# Patient Record
Sex: MALE | Race: WHITE | NOT HISPANIC OR LATINO | Employment: FULL TIME | URBAN - NONMETROPOLITAN AREA
[De-identification: names, ages, dates, MRNs, and addresses within clinical notes are randomized per-mention and may not be internally consistent; named-entity substitution may affect disease eponyms.]

---

## 2023-04-03 DIAGNOSIS — I10 PRIMARY HYPERTENSION: Primary | ICD-10-CM

## 2023-04-03 RX ORDER — METOPROLOL SUCCINATE 25 MG/1
25 TABLET, EXTENDED RELEASE ORAL DAILY
COMMUNITY
Start: 2023-03-05 | End: 2023-04-03 | Stop reason: SDUPTHER

## 2023-04-04 RX ORDER — METOPROLOL SUCCINATE 25 MG/1
25 TABLET, EXTENDED RELEASE ORAL DAILY
Qty: 30 TABLET | Refills: 0 | Status: SHIPPED | OUTPATIENT
Start: 2023-04-04 | End: 2023-05-01

## 2023-05-15 NOTE — PROGRESS NOTES
Subjective     Hugo Serrano is a 57 y.o. male who presents for Follow-up (Follow up meds/check up).      HPI  The patient is a 57 year-old male presenting to the clinic for follow up  . Patient      Had been to the ophthalmologist.  Complaining both eyes are red and crusting.  Has appointment coming up with ophthalmologist.    Bacitracin ophthalmic ointment was prescribed.  Use ointment as directed. He complains of dry mouth.  Referral placed with ENT 5/16/2023.  Educated the patient on obesity, low-fat diet and exercise. Encouraged the patient to lose weight. I have educated the patient on fatigue. The patient denies restless leg syndrome and denies obstructive sleep apnea. Follow up in office. Educated on Dyslipidemia.  Educated on hypertension, low-salt diet and exercise. Will continue to monitor.    Educated on dyslipidemia and diet and exercise.  Complaining feeling tired.  Advised on diet and exercise.  Educated on hypertension low-salt and exercise but educated on obesity and diet and exercise and advised to lose weight.  Complaining dry mouth.      Review of Systems  Review of systems:    General:  Denies fever.  Denies chills.    HEENT:Dictated as above.    Respiratory:  Denies cough.  Denies shortness of breath.    Cardiovascular:  Dictated as above.    Gastrointestinal:  Denies nausea vomiting diarrhea.  Denies abdominal pain.    Genitourinary: Denies burning urination.  Denies frequent urination.  Denies flank pain.  Denies blood in the urine.      Neurology:  Denies tingling numbness but denies weakness.  Denies headache.  Denies blurred vision.    Musculoskeletal:  Denies body aches.  Denies joint pains.  Denies muscle aches.  Denies muscle weakness    Endocrinology:  Dictated as above.    Psychiatric:  Denies depression.  Denies anxiety.  Denies suicidal.  Denies homicidal.      Objective   /77 (BP Location: Left arm, Patient Position: Sitting, BP Cuff Size: Large adult long)   Pulse 76   Ht  1.829 m (6')   Wt (!) 155 kg (342 lb)   SpO2 98%   BMI 46.38 kg/m²        Physical Exam  General.  Not in distress.  HEENT normocephalic anicteric sclerae.  Both palpebral and bulbar conjunctival redness noted neck soft supple no thyromegaly.  No carotid bruit.  Lungs are clear.  Heart regular.  Abdomen soft nontender nondistended bowel sounds are positive.  Extremities no clubbing cyanosis or edema.  Psychiatric.  Has good eye contact.  No crying spells noted.  Speech was normal.  Denies depression.  Denies suicidal.  Denies homicidal.      Assessment/Plan   1.  Bilateral conjunctivitis.  Started on medication.  Keep appointment with ophthalmologist.    2.  Dyslipidemia.  Educated on diet exercise.  We will continue monitor.    3.  Fatigue.  Dictated as above.    4.  Hypertension.  Educated on low-salt diet exercise.  Continue current management    5.  Obesity.  Dictated as above    6.  Dry mouth.  Dictated as above                                  Problem List Items Addressed This Visit          Circulatory    Benign essential HTN       Other    Chronic fatigue    Dyslipidemia     Other Visit Diagnoses       Acute bacterial conjunctivitis of both eyes    -  Primary    Relevant Medications    bacitracin-polymyxin B (Polysporin) ophthalmic ointment    Class 3 severe obesity due to excess calories with serious comorbidity and body mass index (BMI) of 45.0 to 49.9 in adult (CMS/[facility], located within [facility])        Mouth dryness        Relevant Orders    Referral to ENT            Scribe Attestation  By signing my name below, Apurva SHUKLA Scribe   attest that this documentation has been prepared under the direction and in the presence of Patel Fuentes MD.

## 2023-05-16 ENCOUNTER — OFFICE VISIT (OUTPATIENT)
Dept: PRIMARY CARE | Facility: CLINIC | Age: 58
End: 2023-05-16
Payer: COMMERCIAL

## 2023-05-16 VITALS
SYSTOLIC BLOOD PRESSURE: 131 MMHG | BODY MASS INDEX: 42.66 KG/M2 | OXYGEN SATURATION: 98 % | WEIGHT: 315 LBS | DIASTOLIC BLOOD PRESSURE: 77 MMHG | HEIGHT: 72 IN | HEART RATE: 76 BPM

## 2023-05-16 DIAGNOSIS — R53.82 CHRONIC FATIGUE: ICD-10-CM

## 2023-05-16 DIAGNOSIS — E78.5 DYSLIPIDEMIA: ICD-10-CM

## 2023-05-16 DIAGNOSIS — H10.33 ACUTE BACTERIAL CONJUNCTIVITIS OF BOTH EYES: Primary | ICD-10-CM

## 2023-05-16 DIAGNOSIS — I10 BENIGN ESSENTIAL HTN: ICD-10-CM

## 2023-05-16 DIAGNOSIS — R68.2 MOUTH DRYNESS: ICD-10-CM

## 2023-05-16 DIAGNOSIS — E66.01 CLASS 3 SEVERE OBESITY DUE TO EXCESS CALORIES WITH SERIOUS COMORBIDITY AND BODY MASS INDEX (BMI) OF 45.0 TO 49.9 IN ADULT (MULTI): ICD-10-CM

## 2023-05-16 PROBLEM — M79.10 MYALGIA: Status: ACTIVE | Noted: 2023-05-16

## 2023-05-16 PROBLEM — B35.1 ONYCHOMYCOSIS OF TOENAIL: Status: ACTIVE | Noted: 2023-05-16

## 2023-05-16 PROBLEM — M75.101 ROTATOR CUFF SYNDROME OF RIGHT SHOULDER: Status: ACTIVE | Noted: 2023-05-16

## 2023-05-16 PROBLEM — M54.12 ACUTE CERVICAL RADICULOPATHY: Status: ACTIVE | Noted: 2023-05-16

## 2023-05-16 PROBLEM — I83.893 VARICOSE VEINS OF BOTH LEGS WITH EDEMA: Status: ACTIVE | Noted: 2023-05-16

## 2023-05-16 PROBLEM — M25.519 SHOULDER PAIN: Status: ACTIVE | Noted: 2023-05-16

## 2023-05-16 PROBLEM — E66.3 OVERWEIGHT: Status: ACTIVE | Noted: 2023-05-16

## 2023-05-16 PROBLEM — M79.18 MYOFASCIAL PAIN: Status: ACTIVE | Noted: 2023-05-16

## 2023-05-16 PROBLEM — J34.89 NASAL CRUSTING: Status: ACTIVE | Noted: 2023-05-16

## 2023-05-16 PROBLEM — M75.102 ROTATOR CUFF SYNDROME OF LEFT SHOULDER: Status: ACTIVE | Noted: 2023-05-16

## 2023-05-16 PROBLEM — N32.81 OAB (OVERACTIVE BLADDER): Status: ACTIVE | Noted: 2023-05-16

## 2023-05-16 PROBLEM — R60.0 BILATERAL LEG EDEMA: Status: ACTIVE | Noted: 2023-05-16

## 2023-05-16 PROBLEM — M25.50 POLYARTHRALGIA: Status: ACTIVE | Noted: 2023-05-16

## 2023-05-16 PROBLEM — R53.83 OTHER FATIGUE: Status: ACTIVE | Noted: 2018-12-13

## 2023-05-16 PROBLEM — M54.2 CERVICALGIA: Status: ACTIVE | Noted: 2023-05-16

## 2023-05-16 PROBLEM — K21.9 GASTROESOPHAGEAL REFLUX DISEASE: Status: ACTIVE | Noted: 2023-05-16

## 2023-05-16 PROBLEM — M79.89 LEG SWELLING: Status: ACTIVE | Noted: 2023-05-16

## 2023-05-16 PROBLEM — E55.9 VITAMIN D DEFICIENCY: Status: ACTIVE | Noted: 2023-05-16

## 2023-05-16 PROBLEM — R21 SKIN RASH: Status: ACTIVE | Noted: 2023-05-16

## 2023-05-16 PROBLEM — M25.522 LEFT ELBOW PAIN: Status: ACTIVE | Noted: 2023-05-16

## 2023-05-16 PROBLEM — M47.812 CERVICAL SPONDYLOSIS WITHOUT MYELOPATHY: Status: ACTIVE | Noted: 2023-05-16

## 2023-05-16 PROBLEM — F90.9 ADULT ADHD: Status: ACTIVE | Noted: 2023-05-16

## 2023-05-16 PROBLEM — M75.22 BICEPS TENDINITIS OF LEFT UPPER EXTREMITY: Status: ACTIVE | Noted: 2023-05-16

## 2023-05-16 PROBLEM — R20.0 NUMBNESS: Status: ACTIVE | Noted: 2023-05-16

## 2023-05-16 PROBLEM — G47.33 OSA (OBSTRUCTIVE SLEEP APNEA): Status: ACTIVE | Noted: 2023-05-16

## 2023-05-16 PROBLEM — J31.0 RHINITIS, CHRONIC: Status: ACTIVE | Noted: 2023-05-16

## 2023-05-16 PROBLEM — B35.6 JOCK ITCH: Status: ACTIVE | Noted: 2023-05-16

## 2023-05-16 PROBLEM — J30.9 ALLERGIC RHINITIS: Status: ACTIVE | Noted: 2023-05-16

## 2023-05-16 PROBLEM — R63.5 WEIGHT GAIN: Status: ACTIVE | Noted: 2023-05-16

## 2023-05-16 PROBLEM — M79.642 PAIN OF LEFT HAND: Status: ACTIVE | Noted: 2023-05-16

## 2023-05-16 PROBLEM — H44.002 INFECTION OF LEFT EYE: Status: ACTIVE | Noted: 2023-05-16

## 2023-05-16 PROBLEM — L08.9 SKIN INFECTION: Status: ACTIVE | Noted: 2023-05-16

## 2023-05-16 PROBLEM — J01.00 ACUTE MAXILLARY SINUSITIS: Status: ACTIVE | Noted: 2023-05-16

## 2023-05-16 PROBLEM — H33.22 LEFT RETINAL DETACHMENT: Status: ACTIVE | Noted: 2023-05-16

## 2023-05-16 PROBLEM — R07.89 ATYPICAL CHEST PAIN: Status: ACTIVE | Noted: 2023-05-16

## 2023-05-16 PROBLEM — M26.629 TEMPOROMANDIBULAR JOINT PAIN DYSFUNCTION SYNDROME: Status: ACTIVE | Noted: 2023-05-16

## 2023-05-16 PROBLEM — R20.0 NUMBNESS OF LEFT HAND: Status: ACTIVE | Noted: 2023-05-16

## 2023-05-16 PROBLEM — M79.602 PAIN OF LEFT UPPER EXTREMITY: Status: ACTIVE | Noted: 2023-05-16

## 2023-05-16 PROCEDURE — 99214 OFFICE O/P EST MOD 30 MIN: CPT | Performed by: FAMILY MEDICINE

## 2023-05-16 PROCEDURE — 3078F DIAST BP <80 MM HG: CPT | Performed by: FAMILY MEDICINE

## 2023-05-16 PROCEDURE — 3075F SYST BP GE 130 - 139MM HG: CPT | Performed by: FAMILY MEDICINE

## 2023-05-16 PROCEDURE — 3008F BODY MASS INDEX DOCD: CPT | Performed by: FAMILY MEDICINE

## 2023-05-16 PROCEDURE — 1036F TOBACCO NON-USER: CPT | Performed by: FAMILY MEDICINE

## 2023-05-16 RX ORDER — LORATADINE 10 MG/1
10 TABLET ORAL DAILY
COMMUNITY
Start: 2022-06-06

## 2023-05-16 RX ORDER — MELOXICAM 15 MG/1
1 TABLET ORAL DAILY
COMMUNITY
Start: 2023-02-08

## 2023-05-16 RX ORDER — ERGOCALCIFEROL 1.25 MG/1
1 CAPSULE ORAL
COMMUNITY

## 2023-05-16 RX ORDER — LOSARTAN POTASSIUM 100 MG/1
100 TABLET ORAL DAILY
COMMUNITY

## 2023-05-16 RX ORDER — FAMOTIDINE 20 MG/1
1 TABLET, FILM COATED ORAL 2 TIMES DAILY
COMMUNITY
Start: 2023-02-04

## 2023-05-16 RX ORDER — BACITRACIN ZINC AND POLYMYXIN B SULFATE 500; 10000 [USP'U]/G; [USP'U]/G
OINTMENT OPHTHALMIC EVERY 12 HOURS
Qty: 3.5 G | Refills: 0 | Status: SHIPPED | OUTPATIENT
Start: 2023-05-16 | End: 2023-05-26

## 2023-05-16 ASSESSMENT — PAIN SCALES - GENERAL: PAINLEVEL: 0-NO PAIN

## 2023-07-30 DIAGNOSIS — I10 PRIMARY HYPERTENSION: ICD-10-CM

## 2023-08-01 RX ORDER — METOPROLOL SUCCINATE 25 MG/1
TABLET, EXTENDED RELEASE ORAL
Qty: 30 TABLET | Refills: 0 | Status: SHIPPED | OUTPATIENT
Start: 2023-08-01

## 2023-08-26 DIAGNOSIS — I10 PRIMARY HYPERTENSION: ICD-10-CM

## 2023-08-28 RX ORDER — METOPROLOL SUCCINATE 25 MG/1
TABLET, EXTENDED RELEASE ORAL
Qty: 30 TABLET | Refills: 0 | OUTPATIENT
Start: 2023-08-28

## 2024-04-18 ENCOUNTER — LAB (OUTPATIENT)
Dept: LAB | Facility: LAB | Age: 59
End: 2024-04-18
Payer: COMMERCIAL

## 2024-04-18 DIAGNOSIS — I10 ESSENTIAL (PRIMARY) HYPERTENSION: Primary | ICD-10-CM

## 2024-04-18 DIAGNOSIS — Z13.220 ENCOUNTER FOR SCREENING FOR LIPOID DISORDERS: ICD-10-CM

## 2024-04-18 DIAGNOSIS — Z13.1 ENCOUNTER FOR SCREENING FOR DIABETES MELLITUS: ICD-10-CM

## 2024-04-18 LAB
ALBUMIN SERPL BCP-MCNC: 4 G/DL (ref 3.4–5)
ALP SERPL-CCNC: 52 U/L (ref 33–120)
ALT SERPL W P-5'-P-CCNC: 31 U/L (ref 10–52)
ANION GAP SERPL CALC-SCNC: 11 MMOL/L (ref 10–20)
AST SERPL W P-5'-P-CCNC: 25 U/L (ref 9–39)
BILIRUB SERPL-MCNC: 0.7 MG/DL (ref 0–1.2)
BUN SERPL-MCNC: 19 MG/DL (ref 6–23)
CALCIUM SERPL-MCNC: 8.8 MG/DL (ref 8.6–10.3)
CHLORIDE SERPL-SCNC: 106 MMOL/L (ref 98–107)
CHOLEST SERPL-MCNC: 172 MG/DL (ref 0–199)
CHOLESTEROL/HDL RATIO: 4
CO2 SERPL-SCNC: 25 MMOL/L (ref 21–32)
CREAT SERPL-MCNC: 0.86 MG/DL (ref 0.5–1.3)
EGFRCR SERPLBLD CKD-EPI 2021: >90 ML/MIN/1.73M*2
GLUCOSE SERPL-MCNC: 86 MG/DL (ref 74–99)
HDLC SERPL-MCNC: 43.5 MG/DL
LDLC SERPL CALC-MCNC: 118 MG/DL
NON HDL CHOLESTEROL: 129 MG/DL (ref 0–149)
POTASSIUM SERPL-SCNC: 4.1 MMOL/L (ref 3.5–5.3)
PROT SERPL-MCNC: 6.9 G/DL (ref 6.4–8.2)
SODIUM SERPL-SCNC: 138 MMOL/L (ref 136–145)
TRIGL SERPL-MCNC: 54 MG/DL (ref 0–149)
VLDL: 11 MG/DL (ref 0–40)

## 2024-04-18 PROCEDURE — 36415 COLL VENOUS BLD VENIPUNCTURE: CPT

## 2024-04-18 PROCEDURE — 80061 LIPID PANEL: CPT

## 2024-04-18 PROCEDURE — 83036 HEMOGLOBIN GLYCOSYLATED A1C: CPT

## 2024-04-18 PROCEDURE — 80053 COMPREHEN METABOLIC PANEL: CPT

## 2024-04-19 LAB
EST. AVERAGE GLUCOSE BLD GHB EST-MCNC: 117 MG/DL
HBA1C MFR BLD: 5.7 %

## 2024-08-09 PROBLEM — M54.59 MECHANICAL LOW BACK PAIN: Status: ACTIVE | Noted: 2024-08-09

## 2024-08-09 PROBLEM — M67.919 DISORDER OF ROTATOR CUFF: Status: ACTIVE | Noted: 2024-08-09

## 2024-08-09 PROBLEM — M53.3 TAIL BONE PAIN: Status: ACTIVE | Noted: 2024-08-09

## 2024-08-09 PROBLEM — R09.81 NASAL CONGESTION: Status: ACTIVE | Noted: 2024-08-09

## 2024-08-09 PROBLEM — I83.93 VARICOSE VEINS OF LEGS: Status: ACTIVE | Noted: 2024-08-09

## 2024-08-09 PROBLEM — R42 DIZZINESS: Status: ACTIVE | Noted: 2024-08-09

## 2024-08-09 PROBLEM — H04.309 DACRYOCYSTITIS: Status: ACTIVE | Noted: 2024-08-09

## 2024-08-09 PROBLEM — M25.551 PAIN IN RIGHT HIP: Status: ACTIVE | Noted: 2024-08-09

## 2024-08-09 PROBLEM — J20.9 ACUTE BRONCHITIS: Status: RESOLVED | Noted: 2024-08-09 | Resolved: 2024-08-09

## 2024-08-09 PROBLEM — Z91.018 FOOD ALLERGY: Status: ACTIVE | Noted: 2024-08-09

## 2024-08-19 ENCOUNTER — APPOINTMENT (OUTPATIENT)
Dept: PRIMARY CARE | Facility: CLINIC | Age: 59
End: 2024-08-19
Payer: COMMERCIAL

## 2024-08-19 VITALS
DIASTOLIC BLOOD PRESSURE: 85 MMHG | BODY MASS INDEX: 42.66 KG/M2 | OXYGEN SATURATION: 96 % | WEIGHT: 315 LBS | HEIGHT: 72 IN | HEART RATE: 69 BPM | SYSTOLIC BLOOD PRESSURE: 137 MMHG | RESPIRATION RATE: 16 BRPM

## 2024-08-19 DIAGNOSIS — L60.0 INGROWN TOENAIL: ICD-10-CM

## 2024-08-19 DIAGNOSIS — M25.551 PAIN IN RIGHT HIP: ICD-10-CM

## 2024-08-19 DIAGNOSIS — M79.674 PAIN OF TOE OF RIGHT FOOT: ICD-10-CM

## 2024-08-19 DIAGNOSIS — E66.01 MORBID OBESITY (MULTI): ICD-10-CM

## 2024-08-19 DIAGNOSIS — R06.09 DYSPNEA ON EXERTION: ICD-10-CM

## 2024-08-19 DIAGNOSIS — M75.101 ROTATOR CUFF SYNDROME OF RIGHT SHOULDER: ICD-10-CM

## 2024-08-19 DIAGNOSIS — M75.102 ROTATOR CUFF SYNDROME OF LEFT SHOULDER: ICD-10-CM

## 2024-08-19 DIAGNOSIS — R60.0 BILATERAL LEG EDEMA: ICD-10-CM

## 2024-08-19 DIAGNOSIS — E78.5 DYSLIPIDEMIA: ICD-10-CM

## 2024-08-19 DIAGNOSIS — K21.9 GASTROESOPHAGEAL REFLUX DISEASE WITHOUT ESOPHAGITIS: ICD-10-CM

## 2024-08-19 DIAGNOSIS — I10 BENIGN ESSENTIAL HTN: ICD-10-CM

## 2024-08-19 DIAGNOSIS — Z12.11 COLON CANCER SCREENING: Primary | ICD-10-CM

## 2024-08-19 DIAGNOSIS — J30.9 ALLERGIC RHINITIS, UNSPECIFIED SEASONALITY, UNSPECIFIED TRIGGER: ICD-10-CM

## 2024-08-19 PROCEDURE — 3008F BODY MASS INDEX DOCD: CPT | Performed by: INTERNAL MEDICINE

## 2024-08-19 PROCEDURE — 1036F TOBACCO NON-USER: CPT | Performed by: INTERNAL MEDICINE

## 2024-08-19 PROCEDURE — 3079F DIAST BP 80-89 MM HG: CPT | Performed by: INTERNAL MEDICINE

## 2024-08-19 PROCEDURE — 3075F SYST BP GE 130 - 139MM HG: CPT | Performed by: INTERNAL MEDICINE

## 2024-08-19 PROCEDURE — 99214 OFFICE O/P EST MOD 30 MIN: CPT | Performed by: INTERNAL MEDICINE

## 2024-08-19 RX ORDER — AMINOPHYLLINE 25 MG/ML
125 INJECTION, SOLUTION INTRAVENOUS ONCE AS NEEDED
OUTPATIENT
Start: 2024-08-19

## 2024-08-19 RX ORDER — REGADENOSON 0.08 MG/ML
0.4 INJECTION, SOLUTION INTRAVENOUS
OUTPATIENT
Start: 2024-08-19

## 2024-08-19 RX ORDER — MELOXICAM 15 MG/1
15 TABLET ORAL DAILY
Qty: 90 TABLET | Refills: 1 | Status: SHIPPED | OUTPATIENT
Start: 2024-08-19

## 2024-08-19 RX ORDER — FAMOTIDINE 20 MG/1
20 TABLET, FILM COATED ORAL 2 TIMES DAILY
Qty: 180 TABLET | Refills: 0 | Status: SHIPPED | OUTPATIENT
Start: 2024-08-19

## 2024-08-19 RX ORDER — DOXAZOSIN 4 MG/1
4 TABLET ORAL NIGHTLY
COMMUNITY
Start: 2024-07-23

## 2024-08-19 ASSESSMENT — ENCOUNTER SYMPTOMS
SHORTNESS OF BREATH: 1
CONSTITUTIONAL NEGATIVE: 1
NEUROLOGICAL NEGATIVE: 1
GASTROINTESTINAL NEGATIVE: 1
ARTHRALGIAS: 1
PSYCHIATRIC NEGATIVE: 1

## 2024-08-19 ASSESSMENT — PATIENT HEALTH QUESTIONNAIRE - PHQ9
SUM OF ALL RESPONSES TO PHQ9 QUESTIONS 1 AND 2: 0
2. FEELING DOWN, DEPRESSED OR HOPELESS: NOT AT ALL
1. LITTLE INTEREST OR PLEASURE IN DOING THINGS: NOT AT ALL

## 2024-08-19 ASSESSMENT — COLUMBIA-SUICIDE SEVERITY RATING SCALE - C-SSRS
6. HAVE YOU EVER DONE ANYTHING, STARTED TO DO ANYTHING, OR PREPARED TO DO ANYTHING TO END YOUR LIFE?: NO
2. HAVE YOU ACTUALLY HAD ANY THOUGHTS OF KILLING YOURSELF?: NO
1. IN THE PAST MONTH, HAVE YOU WISHED YOU WERE DEAD OR WISHED YOU COULD GO TO SLEEP AND NOT WAKE UP?: NO

## 2024-08-19 ASSESSMENT — PAIN SCALES - GENERAL: PAINLEVEL: 0-NO PAIN

## 2024-08-19 NOTE — PROGRESS NOTES
"Patient ID: He states his right foot 3rd digit has some discomfort inside his toe, and he thinks he sees something on the toe, skin issue. He states it sometimes is more painful when wearing his steel toe boots for work. He states he has been using calamine lotion on it. He states sometimes this toe is \"stiff\". He states he was off work for several months a few years ago due shoulder nerve pain which has resolved for the most part. 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 He states he has been going to gym and stretching a lot, trying to get his mobility. He states that he would get nauseated with the pain he would feel at times, \"raw nerve pain\" which he does not get anymore. He states he has never had issues with his heart. He states he gets winded when lifting heavy objects. He states that he had exercise stress test a couple years ago and was told everything was ok. He denies any issues with constipation or diarrhea.  He states he had been urinating several times at night, until Dr. Fuentes placed him on Flomax, but he never got it refilled. He is now on Cardura which helps his urinary frequency at night. He states that he used to dribble urine before being on  medication. He has never had colonoscopy. He has never had cologuard. He states that he has been on his feet at night all night at work. He states that prior to past 4-5 months, he was not on his feet as much. He states that he was on a water pill for awhile but it made him very tired, and it was stopped because he could not function on diuretics(tried two different types and both affected him the same). He states that he wears compression stockings when he is on his feet which helps with the BLE edema. He states he still has occasional sinus pain, has history of chronic sinus issues and sinus surgeries(Dr. Catherine).    HEALTH MAINTENANCE: Establish Care  Previous PCP: Dr. Fuentes  Last Office Visit: 5/16/23  Last " Labs: 4/18/24 CMP wnl, A1c 5.7, lipid panel  PSA Screening (starting at age 55 till 69): 1/14/23 wnl  Colonoscopy (45-75 or age 40 with 1st degree relative dx colon ca): Never had  Lung cancer screening (50-76 y/o x 20 pk yr for at least 20 yrs + current smoker OR quit in last 15 years, no CT w/I last year): quit smoking in 2002  DEXA (65+, q 2 years): n/a  2D echo 3/21/22 revealed: 1. The left ventricular systolic function is normal with a 60-65% estimated ejection fraction.     LIZANDRO Serrano is a 58 y.o. male with PMH remarkable for HTN, Dyslipidemia, cataracts who presents to the office today to establish care.     Social History     Tobacco Use   • Smoking status: Never   • Smokeless tobacco: Never   Vaping Use   • Vaping status: Never Used   Substance Use Topics   • Alcohol use: Not Currently   • Drug use: Never       There is no immunization history on file for this patient.    Review of Systems   Constitutional: Negative.    HENT: Negative.     Respiratory:  Positive for shortness of breath.    Cardiovascular:  Positive for leg swelling.   Gastrointestinal: Negative.    Genitourinary:         Urinary frequency at night   Musculoskeletal:  Positive for arthralgias.        Bilateral shoulder pain with occasional numbness in upper extremities  Right foot toe pain   Neurological: Negative.    Psychiatric/Behavioral: Negative.         Allergies   Allergen Reactions   • Cephalexin Unknown   • Hydrocodone Other     Feels angry and wants to attack someone   • Lisinopril Cough   • Penicillins Unknown      Visit Vitals  Vitals:    08/19/24 1100   BP: 137/85   BP Location: Left arm   Pulse: 69   Resp: 16   SpO2: 96%   Weight: (!) 155 kg (341 lb 1.6 oz)   Height: 1.829 m (6')      Smoking Status Never     Physical Exam  Vitals reviewed.   Constitutional:       Appearance: Normal appearance. He is obese.   HENT:      Head: Normocephalic and atraumatic.   Cardiovascular:      Rate and Rhythm: Normal rate and regular  rhythm.      Pulses: Normal pulses.      Heart sounds: Normal heart sounds.   Pulmonary:      Effort: Pulmonary effort is normal.      Breath sounds: Normal breath sounds.   Abdominal:      General: Bowel sounds are normal.      Palpations: Abdomen is soft.   Musculoskeletal:      Right lower leg: Edema present.      Left lower leg: Edema present.      Comments: There was a little bit of tenderness over bicep and anterior part of shoulder, raising left arm to 130 degrees produced some pain   Skin:     General: Skin is warm and dry.   Neurological:      General: No focal deficit present.      Mental Status: He is alert and oriented to person, place, and time.   Psychiatric:         Mood and Affect: Mood normal.         Behavior: Behavior normal.       Current Outpatient Medications   Medication Instructions   • ergocalciferol (Vitamin D-2) 1.25 MG (43951 UT) capsule 1 capsule, oral, Once Weekly   • famotidine (Pepcid) 20 mg tablet 1 tablet, oral, 2 times daily   • loratadine (CLARITIN) 10 mg, oral, Daily   • losartan (COZAAR) 100 mg, oral, Daily   • meloxicam (Mobic) 15 mg tablet 1 tablet, oral, Daily   • metoprolol succinate XL (Toprol-XL) 25 mg 24 hr tablet take 1 tablet by mouth once daily       Lab Results   Component Value Date    WBC 8.7 01/24/2023    HGB 15.2 01/24/2023    HCT 46.5 01/24/2023     01/24/2023    CHOL 172 04/18/2024    TRIG 54 04/18/2024    HDL 43.5 04/18/2024    ALT 31 04/18/2024    AST 25 04/18/2024     04/18/2024    K 4.1 04/18/2024     04/18/2024    CREATININE 0.86 04/18/2024    BUN 19 04/18/2024    CO2 25 04/18/2024    TSH 1.01 01/24/2023    HGBA1C 5.7 (H) 04/18/2024     Problem List Items Addressed This Visit             ICD-10-CM    Allergic rhinitis J30.9     Ok to continue with loratadine         Benign essential HTN I10     BP is ok, 137/85  Continue with Losartan and Cardura         Bilateral leg edema R60.0     Continue with compression stockings          Dyslipidemia E78.5     Reviewed most recent lipid panel from 04/2024 which was improved  Continue to monitor         Gastroesophageal reflux disease K21.9    Relevant Medications    famotidine (Pepcid) 20 mg tablet    Morbid obesity (Multi) E66.01     Advised to work on diet and exercise         Relevant Medications    tirzepatide, weight loss, (Zepbound) 2.5 mg/0.5 mL injection    Rotator cuff syndrome of left shoulder M75.102     Ok to continue with meloxicam, advised to take 3 times per week maximum  He states he has probably only taken 12 tablets in past 12 months  Advised to avoid NSAIDS while taking this medication         Relevant Medications    meloxicam (Mobic) 15 mg tablet    Rotator cuff syndrome of right shoulder M75.101     Will refill meloxicam         Relevant Medications    meloxicam (Mobic) 15 mg tablet    Pain in right hip M25.551     Other Visit Diagnoses         Codes    Colon cancer screening    -  Primary Z12.11    Relevant Orders    Cologuard® colon cancer screening    Pain of toe of right foot     M79.674    Relevant Orders    Referral to Podiatry    Ingrown toenail     L60.0    Relevant Orders    Referral to Podiatry    Dyspnea on exertion     R06.09    Relevant Orders    Nuclear Stress Test          --------------------  Written by Leanne Mccullough LPN, acting as a scribe for Dr. Lau. This note accurately reflects the work and decisions made by Dr. Lau.     I, Dr. Lau, attest all medical record entries made by the scribe were under my direction and were personally dictated by me. I have reviewed the chart and agree that the record accurately reflects my performance of the history, physical exam, and assessment and plan.     face

## 2024-08-19 NOTE — PATIENT INSTRUCTIONS
It was great to see you in the office today! Here is what we discussed at your visit today:  Please continue to take your current medications   We have placed order for cologuard. Complete as directed.   We have placed referral for chemical cardiac stress test. Schedule this test, we will call you with results  We have placed order for podiatry referral for your toe  We have sent in prescription for Zepbound for weight loss. Call if any issues with this medication as discussed  Follow up in four months

## 2024-08-19 NOTE — ASSESSMENT & PLAN NOTE
Ok to continue with meloxicam, advised to take 3 times per week maximum  He states he has probably only taken 12 tablets in past 12 months  Advised to avoid NSAIDS while taking this medication

## 2024-08-22 DIAGNOSIS — E66.01 MORBID OBESITY (MULTI): ICD-10-CM

## 2024-10-15 ENCOUNTER — TELEPHONE (OUTPATIENT)
Dept: SCHEDULING | Age: 59
End: 2024-10-15
Payer: COMMERCIAL

## 2024-12-09 ENCOUNTER — APPOINTMENT (OUTPATIENT)
Dept: PRIMARY CARE | Facility: CLINIC | Age: 59
End: 2024-12-09
Payer: COMMERCIAL

## 2025-01-03 DIAGNOSIS — I10 BENIGN ESSENTIAL HTN: ICD-10-CM

## 2025-01-03 RX ORDER — DOXAZOSIN 4 MG/1
4 TABLET ORAL NIGHTLY
Qty: 90 TABLET | Refills: 0 | Status: SHIPPED | OUTPATIENT
Start: 2025-01-03

## 2025-01-20 ENCOUNTER — APPOINTMENT (OUTPATIENT)
Dept: PRIMARY CARE | Facility: CLINIC | Age: 60
End: 2025-01-20
Payer: COMMERCIAL

## 2025-01-20 VITALS
TEMPERATURE: 97.5 F | OXYGEN SATURATION: 92 % | SYSTOLIC BLOOD PRESSURE: 159 MMHG | BODY MASS INDEX: 42.66 KG/M2 | HEIGHT: 72 IN | DIASTOLIC BLOOD PRESSURE: 88 MMHG | RESPIRATION RATE: 16 BRPM | WEIGHT: 315 LBS | HEART RATE: 69 BPM

## 2025-01-20 DIAGNOSIS — I83.893 VARICOSE VEINS OF BOTH LEGS WITH EDEMA: ICD-10-CM

## 2025-01-20 DIAGNOSIS — F41.9 ANXIETY: ICD-10-CM

## 2025-01-20 DIAGNOSIS — I10 BENIGN ESSENTIAL HTN: ICD-10-CM

## 2025-01-20 DIAGNOSIS — E78.5 DYSLIPIDEMIA: ICD-10-CM

## 2025-01-20 DIAGNOSIS — E66.01 MORBID OBESITY (MULTI): ICD-10-CM

## 2025-01-20 DIAGNOSIS — N32.81 OAB (OVERACTIVE BLADDER): ICD-10-CM

## 2025-01-20 PROCEDURE — 3079F DIAST BP 80-89 MM HG: CPT | Performed by: INTERNAL MEDICINE

## 2025-01-20 PROCEDURE — 1036F TOBACCO NON-USER: CPT | Performed by: INTERNAL MEDICINE

## 2025-01-20 PROCEDURE — 99214 OFFICE O/P EST MOD 30 MIN: CPT | Performed by: INTERNAL MEDICINE

## 2025-01-20 PROCEDURE — 3077F SYST BP >= 140 MM HG: CPT | Performed by: INTERNAL MEDICINE

## 2025-01-20 PROCEDURE — 3008F BODY MASS INDEX DOCD: CPT | Performed by: INTERNAL MEDICINE

## 2025-01-20 RX ORDER — BUSPIRONE HYDROCHLORIDE 5 MG/1
5 TABLET ORAL 3 TIMES DAILY PRN
Qty: 90 TABLET | Refills: 0 | Status: SHIPPED | OUTPATIENT
Start: 2025-01-20 | End: 2026-01-20

## 2025-01-20 ASSESSMENT — COLUMBIA-SUICIDE SEVERITY RATING SCALE - C-SSRS
6. HAVE YOU EVER DONE ANYTHING, STARTED TO DO ANYTHING, OR PREPARED TO DO ANYTHING TO END YOUR LIFE?: NO
1. IN THE PAST MONTH, HAVE YOU WISHED YOU WERE DEAD OR WISHED YOU COULD GO TO SLEEP AND NOT WAKE UP?: NO
2. HAVE YOU ACTUALLY HAD ANY THOUGHTS OF KILLING YOURSELF?: NO

## 2025-01-20 ASSESSMENT — PAIN SCALES - GENERAL: PAINLEVEL_OUTOF10: 0-NO PAIN

## 2025-01-20 NOTE — PROGRESS NOTES
Episodes of shortness of breath that comes and goes last 5-15 minutes states that it happens after eating     States that he feels unmotivated and fatigued.

## 2025-01-20 NOTE — ASSESSMENT & PLAN NOTE
Continue with losartan  Advised to monitor BP at home 2-3 times per week and call us with results after 3 weeks

## 2025-01-20 NOTE — ASSESSMENT & PLAN NOTE
He states he breaks up his Cardura, takes 2mg morning, afternoon and night during day and it helps with his OAB  Advised it is ok to continue to break up the medication

## 2025-01-20 NOTE — PATIENT INSTRUCTIONS
It was great to see you in the office today! Here is what we discussed at your visit today:  Please continue to take your current medications     As discussed, schedule an appointment with Dr. Mohseni for evaluation of your varicose veins  Here is her office information:  Address: [address], [place] [address]  Hours: Open ? Closes 5?PM  Phone: [phone]    Please schedule nuclear stress test as discussed, it was previously ordered    Please completed cologuard which was previously ordered    A prescription was sent in for Buspar to help with anxiety, you can take up to three times per day as needed    Follow up in four months

## 2025-01-20 NOTE — PROGRESS NOTES
Patient ID: He states he is still having some toe pain on right foot. He states he has not seen podiatrist. 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 He states he does not get a lot of sleep due to working night shift. He states he tries to avoid eating sometimes, because it makes him tired. He states that he has not been taking Zepbound for weight loss due to cost.  He states he had a brief spell of SOB after taking Losartan last week, but it did not last long. He states that he had some SOB after eating last week as well. He states he was treated with antibiotic for urinary symptoms in the past and it helped with his symptoms and would like to be checked for infection. He states that he has no urinary burning. He denies depression, states he has been more anxious due to a new boss at work.     HEALTH MAINTENANCE: Follow Up  Last Office Visit: 8/19/24 had c/o BACON, stress test ordered not completed, cologuard ordered not completed, referred to podiatry for right foot pain  Last Labs: 4/18/24  PSA Screening (starting at age 55 till 69): 1/24/23 wnl  Colonoscopy (45-75 or age 40 with 1st degree relative dx colon ca): cologuard ordered in August, not completed  Lung cancer screening (50-78 y/o x 20 pk yr for at least 20 yrs + current smoker OR quit in last 15 years, no CT w/I last year): never smoker  DEXA (65+, q 2 years women and 70+ men): na    HPI Hugo Serrano is a 59 y.o. male with PMH remarkable for HTN, Dyslipidemia, cataracts who presents to the office today for follow up.    Social History     Tobacco Use    Smoking status: Never    Smokeless tobacco: Never   Vaping Use    Vaping status: Never Used   Substance Use Topics    Alcohol use: Not Currently    Drug use: Never     Review of Systems   Constitutional: Negative.    HENT: Negative.     Respiratory:  Positive for shortness of breath.    Cardiovascular: Negative.    Genitourinary: Negative.     Musculoskeletal:  Positive for arthralgias.   Skin: Negative.    Neurological: Negative.    Psychiatric/Behavioral:  The patient is nervous/anxious.      Visit Vitals  Vitals:    01/20/25 1047   BP: 159/88   BP Location: Right arm   Pulse: 69   Resp: 16   Temp: 36.4 °C (97.5 °F)   TempSrc: Temporal   SpO2: 92%   Weight: (!) 161 kg (354 lb 11.2 oz)   Height: 1.829 m (6')      Smoking Status Never     Physical Exam  Vitals reviewed.   Constitutional:       Appearance: Normal appearance. He is obese.   HENT:      Head: Normocephalic and atraumatic.   Cardiovascular:      Rate and Rhythm: Normal rate and regular rhythm.      Pulses: Normal pulses.      Heart sounds: Normal heart sounds.   Pulmonary:      Effort: Pulmonary effort is normal.      Breath sounds: Normal breath sounds.   Abdominal:      General: Bowel sounds are normal.      Palpations: Abdomen is soft.   Musculoskeletal:         General: Normal range of motion.      Comments: Mild edema bilateral lower extremities  Varicose veins bilateral lower extremities   Skin:     General: Skin is warm and dry.   Neurological:      General: No focal deficit present.      Mental Status: He is alert and oriented to person, place, and time.   Psychiatric:         Mood and Affect: Mood normal.         Behavior: Behavior normal.       Current Outpatient Medications   Medication Instructions    busPIRone (BUSPAR) 5 mg, oral, 3 times daily PRN    doxazosin (CARDURA) 4 mg, oral, Nightly    ergocalciferol (Vitamin D-2) 1.25 MG (12297 UT) capsule 1 capsule, oral, Once Weekly    famotidine (PEPCID) 20 mg, oral, 2 times daily    loratadine (CLARITIN) 10 mg, oral, Daily    losartan (COZAAR) 100 mg, oral, Daily    meloxicam (MOBIC) 15 mg, oral, Daily    tirzepatide (weight loss) (ZEPBOUND) 2.5 mg, subcutaneous, Every 7 days      Lab Results   Component Value Date    WBC 8.7 01/24/2023    HGB 15.2 01/24/2023    HCT 46.5 01/24/2023     01/24/2023    CHOL 172 04/18/2024     TRIG 54 04/18/2024    HDL 43.5 04/18/2024    ALT 31 04/18/2024    AST 25 04/18/2024     04/18/2024    K 4.1 04/18/2024     04/18/2024    CREATININE 0.86 04/18/2024    BUN 19 04/18/2024    CO2 25 04/18/2024    TSH 1.01 01/24/2023    HGBA1C 5.7 (H) 04/18/2024       Problem List Items Addressed This Visit             ICD-10-CM    Anxiety F41.9    Relevant Medications    busPIRone (Buspar) 5 mg tablet    Other Relevant Orders    Tsh With Reflex To Free T4 If Abnormal    Vitamin B12    Benign essential HTN I10     Continue with losartan  Advised to monitor BP at home 2-3 times per week and call us with results after 3 weeks           Relevant Orders    Comprehensive metabolic panel    CBC and Auto Differential    Dyslipidemia E78.5    Morbid obesity (Multi) E66.01    Relevant Orders    Vitamin D 25-Hydroxy,Total (for eval of Vitamin D levels)    OAB (overactive bladder) N32.81     He states he breaks up his Cardura, takes 2mg morning, afternoon and night during day and it helps with his OAB  Advised it is ok to continue to break up the medication          Relevant Orders    Prostate Spec.Ag,Screen    Varicose veins of both legs with edema I83.893     Advised to follow up with vein specialist            --------------------  Written by Keely Lawrence MD, acting as a scribe for Dr. Lau. This note accurately reflects the work and decisions made by Dr. Lau.     I, Dr. Lau, attest all medical record entries made by the scribe were under my direction and were personally dictated by me. I have reviewed the chart and agree that the record accurately reflects my performance of the history, physical exam, and assessment and plan.

## 2025-01-21 ASSESSMENT — ENCOUNTER SYMPTOMS
NEUROLOGICAL NEGATIVE: 1
CONSTITUTIONAL NEGATIVE: 1
NERVOUS/ANXIOUS: 1
CARDIOVASCULAR NEGATIVE: 1
ARTHRALGIAS: 1
SHORTNESS OF BREATH: 1

## 2025-01-28 DIAGNOSIS — I10 BENIGN ESSENTIAL HTN: ICD-10-CM

## 2025-01-28 RX ORDER — DOXAZOSIN 4 MG/1
TABLET ORAL
Qty: 90 TABLET | Refills: 0 | Status: SHIPPED | OUTPATIENT
Start: 2025-01-28

## 2025-02-12 DIAGNOSIS — I10 BENIGN ESSENTIAL HTN: Primary | ICD-10-CM

## 2025-02-12 RX ORDER — LOSARTAN POTASSIUM 100 MG/1
100 TABLET ORAL DAILY
Qty: 90 TABLET | Refills: 0 | Status: SHIPPED | OUTPATIENT
Start: 2025-02-12

## 2025-05-05 DIAGNOSIS — I10 BENIGN ESSENTIAL HTN: ICD-10-CM

## 2025-05-05 RX ORDER — LOSARTAN POTASSIUM 100 MG/1
TABLET ORAL
Qty: 90 TABLET | Refills: 0 | Status: SHIPPED | OUTPATIENT
Start: 2025-05-05

## 2025-05-18 NOTE — PROGRESS NOTES
Patient ID: Hugo Serrano is a 59 y.o. male with PMH remarkable for HTN, Dyslipidemia, cataracts  who presents to the office today for follow up.     HEALTH MAINTENANCE: Follow Up  Last Office Visit: 1/20/25  Last Labs: 4/18/24; ordered 1/20/25 but not  completed  PSA Screening (starting at age 55 till 69): 1/24/23 wnl; outstanding order in place  Colonoscopy (45-75 or age 40 with 1st degree relative dx colon ca): cologuard ordered in August, not completed  Lung cancer screening (50-78 y/o x 20 pk yr for at least 20 yrs + current smoker OR quit in last 15 years, no CT w/I last year): never smoker  DEXA (65+, q 2 years women and 70+ men): na    - stress test was ordered at previous OV due to SOB, but has not been completed    - previously ordered Zepbound for weight loss but it was not affordable    HPI He states he recently had to go to chiropractor, had some pain in his left hip/buttock area which shoots down his leg. He states that he went to chiropractor but could not afford to go three times per week. He states he still gets pain when he sits certain ways or positions. He denies any urinary burning. He states that he was taking some workout stuff and his right toes started burning so he quit taking the vitamins. He states that he stopped taking Cardura because it was not helping with his urination any longer, it was effective in the mornings. His right toes do not burn anymore since stopping vitamins and preworkout supplements. He states he sleeps ok, has been on night shift for 18 years. He states he has been going to the gym since October, and has been using a stationary bike at home for past ten days.     Social History[1]    Review of Systems   Constitutional: Negative.    HENT: Negative.     Respiratory: Negative.     Cardiovascular: Negative.    Gastrointestinal: Negative.    Genitourinary: Negative.    Musculoskeletal:  Positive for arthralgias.   Skin: Negative.    Neurological: Negative.     Psychiatric/Behavioral: Negative.       Visit Vitals  Vitals:    05/19/25 0959   BP: 146/83   BP Location: Left arm   Pulse: 70   Resp: 16   SpO2: 95%   Weight: (!) 152 kg (335 lb)   Height: 1.829 m (6')      Smoking Status Never     Physical Exam  Vitals reviewed.   Constitutional:       Appearance: Normal appearance. He is obese.   HENT:      Head: Normocephalic and atraumatic.   Cardiovascular:      Rate and Rhythm: Normal rate and regular rhythm.      Pulses: Normal pulses.      Heart sounds: Normal heart sounds.   Pulmonary:      Effort: Pulmonary effort is normal.      Breath sounds: Normal breath sounds.   Abdominal:      General: Bowel sounds are normal.      Palpations: Abdomen is soft.   Musculoskeletal:         General: Tenderness present. Normal range of motion.      Cervical back: Normal range of motion.   Skin:     General: Skin is warm and dry.   Neurological:      General: No focal deficit present.      Mental Status: He is alert and oriented to person, place, and time.   Psychiatric:         Mood and Affect: Mood normal.         Behavior: Behavior normal.         Current Outpatient Medications   Medication Instructions    busPIRone (BUSPAR) 5 mg, oral, 3 times daily PRN    doxazosin (Cardura) 4 mg tablet To take one tablet nightly    ergocalciferol (Vitamin D-2) 1.25 MG (01215 UT) capsule 1 capsule, oral, Once Weekly    famotidine (PEPCID) 20 mg, oral, 2 times daily    loratadine (CLARITIN) 10 mg, oral, Daily    losartan (Cozaar) 100 mg tablet To take one tablet by mouth once daily    meloxicam (MOBIC) 15 mg, oral, Daily    tirzepatide (weight loss) (ZEPBOUND) 2.5 mg, subcutaneous, Every 7 days      Lab Results   Component Value Date    WBC 8.7 01/24/2023    HGB 15.2 01/24/2023    HCT 46.5 01/24/2023     01/24/2023    CHOL 172 04/18/2024    TRIG 54 04/18/2024    HDL 43.5 04/18/2024    ALT 31 04/18/2024    AST 25 04/18/2024     04/18/2024    K 4.1 04/18/2024     04/18/2024     CREATININE 0.86 04/18/2024    BUN 19 04/18/2024    CO2 25 04/18/2024    TSH 1.01 01/24/2023    HGBA1C 5.7 (H) 04/18/2024       Problem List Items Addressed This Visit       Benign essential HTN    Current Assessment & Plan   Continue with Losartan         Relevant Medications    losartan (Cozaar) 100 mg tablet    doxazosin (Cardura) 4 mg tablet    Morbid obesity (Multi)    Current Assessment & Plan   He has lost about 20# since previous OV, with exercise and diet  Continue with diet and exercise         OAB (overactive bladder)    Current Assessment & Plan   He stopped taking Cardura 6mg daily because he had been on it for long time an did not feel it was effective any longer  Will start back on Cardura 4mg daily, prescription sent in         Varicose veins of both legs with edema    Current Assessment & Plan   He states symptoms have significantly improved with wearing compression stockings daily  He states he may follow up with vein specialist in the future         Anxiety    Current Assessment & Plan   He states he never started to take the Buspar as the stressful situation resolved, but has considered trying it as needed for when he has anxiety to see if it helps, has medication at home         Acute low back pain with left-sided sciatica    Relevant Orders    Referral to Physical Therapy      Reminded patient about outstanding lab orders and Cologuard order, he states he has cologuard kit at home, will complete and will get fasting bloodwork  He states he has to hold off on cardiac testing due to cost    -------------------  Written by Shirin Mccullough RN, acting as a scribe for Dr. Lau. This note accurately reflects the work and decisions made by Dr. Lau.     I, Dr. Lau, attest all medical record entries made by the scribe were under my direction and were personally dictated by me. I have reviewed the chart and agree that the record accurately reflects my performance of the history, physical exam, and assessment  and plan.          [1]   Social History  Tobacco Use    Smoking status: Never    Smokeless tobacco: Never   Vaping Use    Vaping status: Never Used   Substance Use Topics    Alcohol use: Not Currently    Drug use: Never

## 2025-05-19 ENCOUNTER — APPOINTMENT (OUTPATIENT)
Dept: PRIMARY CARE | Facility: CLINIC | Age: 60
End: 2025-05-19
Payer: COMMERCIAL

## 2025-05-19 VITALS
HEART RATE: 70 BPM | HEIGHT: 72 IN | SYSTOLIC BLOOD PRESSURE: 146 MMHG | BODY MASS INDEX: 42.66 KG/M2 | DIASTOLIC BLOOD PRESSURE: 83 MMHG | OXYGEN SATURATION: 95 % | WEIGHT: 315 LBS | RESPIRATION RATE: 16 BRPM

## 2025-05-19 DIAGNOSIS — F41.9 ANXIETY: ICD-10-CM

## 2025-05-19 DIAGNOSIS — N32.81 OAB (OVERACTIVE BLADDER): ICD-10-CM

## 2025-05-19 DIAGNOSIS — I10 BENIGN ESSENTIAL HTN: ICD-10-CM

## 2025-05-19 DIAGNOSIS — I83.893 VARICOSE VEINS OF BOTH LEGS WITH EDEMA: ICD-10-CM

## 2025-05-19 DIAGNOSIS — E66.01 MORBID OBESITY (MULTI): ICD-10-CM

## 2025-05-19 DIAGNOSIS — M54.42 ACUTE LOW BACK PAIN WITH LEFT-SIDED SCIATICA, UNSPECIFIED BACK PAIN LATERALITY: ICD-10-CM

## 2025-05-19 PROCEDURE — 99214 OFFICE O/P EST MOD 30 MIN: CPT | Performed by: INTERNAL MEDICINE

## 2025-05-19 PROCEDURE — 3077F SYST BP >= 140 MM HG: CPT | Performed by: INTERNAL MEDICINE

## 2025-05-19 PROCEDURE — 3008F BODY MASS INDEX DOCD: CPT | Performed by: INTERNAL MEDICINE

## 2025-05-19 PROCEDURE — 1036F TOBACCO NON-USER: CPT | Performed by: INTERNAL MEDICINE

## 2025-05-19 PROCEDURE — 3079F DIAST BP 80-89 MM HG: CPT | Performed by: INTERNAL MEDICINE

## 2025-05-19 RX ORDER — DOXAZOSIN 4 MG/1
4 TABLET ORAL NIGHTLY
Qty: 90 TABLET | Refills: 0 | Status: SHIPPED | OUTPATIENT
Start: 2025-05-19

## 2025-05-19 RX ORDER — LOSARTAN POTASSIUM 100 MG/1
100 TABLET ORAL DAILY
Qty: 90 TABLET | Refills: 1 | Status: SHIPPED | OUTPATIENT
Start: 2025-05-19

## 2025-05-19 ASSESSMENT — PATIENT HEALTH QUESTIONNAIRE - PHQ9
2. FEELING DOWN, DEPRESSED OR HOPELESS: NOT AT ALL
SUM OF ALL RESPONSES TO PHQ9 QUESTIONS 1 AND 2: 0
1. LITTLE INTEREST OR PLEASURE IN DOING THINGS: NOT AT ALL

## 2025-05-19 ASSESSMENT — ENCOUNTER SYMPTOMS
RESPIRATORY NEGATIVE: 1
ARTHRALGIAS: 1
GASTROINTESTINAL NEGATIVE: 1
PSYCHIATRIC NEGATIVE: 1
CARDIOVASCULAR NEGATIVE: 1
NEUROLOGICAL NEGATIVE: 1
CONSTITUTIONAL NEGATIVE: 1

## 2025-05-19 ASSESSMENT — PAIN SCALES - GENERAL: PAINLEVEL_OUTOF10: 0-NO PAIN

## 2025-05-19 NOTE — PATIENT INSTRUCTIONS
It was great to see you in the office today! Here is what we discussed at your visit today:    Please have your blood drawn in the next 1-2 weeks.   You need to be FASTING for 12 hours prior to blood draw.  Please contact your insurance company to ask about the best location to get blood drawn.  We will contact you with the results of your blood work and any necessary adjustments to your plan of care.  Iif you do not hear from us within 3-5 days of having your blood drawn, please call the [place] office at [phone].    Please continue to take your current medications     A prescription was sent in for Cardura 4mg to take every night to help with urination.    A referral was placed for cologuard test kit. Please complete this once you receive it and then send it back according to directions on back. We will call you with results once we receive them    A referral was placed for physical therapy for your back pain as discussed    Follow up in four months

## 2025-05-19 NOTE — ASSESSMENT & PLAN NOTE
He stopped taking Cardura 6mg daily because he had been on it for long time an did not feel it was effective any longer  Will start back on Cardura 4mg daily, prescription sent in

## 2025-05-19 NOTE — ASSESSMENT & PLAN NOTE
He states symptoms have significantly improved with wearing compression stockings daily  He states he may follow up with vein specialist in the future

## 2025-05-19 NOTE — ASSESSMENT & PLAN NOTE
He states he never started to take the Buspar as the stressful situation resolved, but has considered trying it as needed for when he has anxiety to see if it helps, has medication at home

## 2025-06-09 ENCOUNTER — APPOINTMENT (OUTPATIENT)
Dept: OTOLARYNGOLOGY | Facility: CLINIC | Age: 60
End: 2025-06-09
Payer: COMMERCIAL

## 2025-06-09 DIAGNOSIS — J30.9 ALLERGIC RHINITIS, UNSPECIFIED SEASONALITY, UNSPECIFIED TRIGGER: ICD-10-CM

## 2025-06-09 DIAGNOSIS — H83.3X3 ACOUSTIC TRAUMA OF BOTH EARS: Primary | ICD-10-CM

## 2025-06-09 PROCEDURE — 99213 OFFICE O/P EST LOW 20 MIN: CPT | Performed by: OTOLARYNGOLOGY

## 2025-06-09 PROCEDURE — 1036F TOBACCO NON-USER: CPT | Performed by: OTOLARYNGOLOGY

## 2025-06-09 RX ORDER — LORATADINE 10 MG/1
10 TABLET ORAL DAILY
Qty: 90 TABLET | Refills: 3 | Status: SHIPPED | OUTPATIENT
Start: 2025-06-09 | End: 2026-06-04

## 2025-06-09 NOTE — PROGRESS NOTES
"  Progress Notes  Alesia Catherine MD (Physician)  Otolaryngology    Chief Complaint   Hearing problem     Adult Risk ScreeningAdult Risk Screening_[facility]:   Initial Fall Risk Screening: The patient is not using an assistive device.             History of Present Illness    06.09.2025: He had a genoveva test at an outside hearing center. He has signs of acoustic trauma bilaterally worse in his left ear. History of acoustic trauma. Working in a noisy environment.     Recommendations:  1- hearing test   2- use hearing protection  _________________________________________________________________    07.17.2023: He had two \"bloody' eyes recently. Off and on he gets a discharge out of his left eye. Sometimes he gets a discharge out of his right eye too. No bloody eye for the past weeks. He still feels some left eye pain.     Secondly, he has neck pain. He has dizziness. He had it twice. Started last Tuesday night. It went away but it lasted for couple of days lightly.      Ear fullness: maybe little bit at left  Tinnitus: off and on lightly  Recent decrease in hearing (-)   He is using blood pressure pills. He took it, it made dizziness worse.     Ears look normal  No visible purulent nasal or postnasal discharge.  Sinus x rays do not show an acute problem.  Mild whitish eye discharge.     My impression he off and on gets dacryocystitis.   Dizziness could be due to cervical problems or hypotension.      Recommendations:  1- oral generic augmentin for 10 days, ofloxacin eye drops  2- follow up as needed     ____________________________________________________________________________________________     05.17.2022: He comes for follow up. He feels inflammation in his left ear. He is on a bladder pill (oxybutynin).   he has nasal crusting, had some bleeding recently.  He has left TMJ pain  Off and on he has nasal congestion.     On examination, he has nasal crusting anteriorly on both sides.  Ears look essentially normal.   "   Plan:  1- mupirocin ointment  2- follow up in 3 months     _______________________________________________________________________________________________        Mr. Serrano is a 54 yo M. He has left facial pain for months. He has been to a chiropractor and recently, his pain is not bad at all for the past couple of weeks. It only happens when he rides an equipment at his workplace.  He also has headache issues.  He recently had an MRI at an outside center, it has been scanned into EMR. It shows mucosal thickenings at maxillary sinuses (mild) and a small left maxillary sinus cyst.  History of TMJ problem (+)  He has difficulty with breathing from his nose at night.     Retinal detachment (+) at left eye.  HT (+)     On examination, ears look normal  Inferior turbinates look moderately congested. Throat normal  Patient points out to left TMJ and related muscled for the site of pain.  Mild yellowish discharge coming out of left eye.  Neck normal.     Dx: Left TMJ pain and related facial myalgia  Nasal congestion at night  Maxillary sinusitis (unlikely to cause this facial pain)  Left eye infection (mild)     Plan-recommendations:  1- Steroid injection to left TMJ  2- RF application to inferior nasal turbinates  3- heat application and massage to left facial muscles and to left TMJ  4- doxycycline and bacitracin ointment for left eye infection     08.18.2021: He has nasal congestion, left facial pain is gone after oral antibiotic.  He had RF treatment to inferior turbinates.     RADIOFREQUENCY APPLICATION TO INFERIOR NASAL TURBINATES:  He has nasal congestion at night. 4% lidocaine was applied into patient's nose. Lidocaine, mixed with bupivacaine and epinephrine was injected to left inferior turbinate. Radiofrequency was applied to left inferior turbinate at multiple points (max 20 mackey).     Lidocaine, mixed with bupivacaine and epinephrine was injected to right inferior turbinate. Radiofrequency was applied to  right inferior turbinate at multiple points. Minor bleeding was encountered (max 20 mackey). Patient tolerated the procedure well.     Plan:  1- follow up in 6 weeks     09.14.2021: He comes for follow up. Nose feels better. On examination ,there was crusting at middle part of inferior turbinates. Anterior parts are shrunk, he can benefit from a second treatment tot middle and posterior parts of his inferior turbinates.     He possibly has food allergies affecting his nose, cheese can make him stuffy,     Plan:  1- follow up in one month, possible second RF treatment  2- allergy test        10.12.2021: He comes for follow up. Off and on he gets left facial pain. On examination he has left TMJ tenderness. Left inferior turbinate was shrunk, Posterior part of right inferior turbinate was still swollen. He had a second RF treatment to posterior part of right inferior turbinate today.     Local steroid and anesthetic injection was done to / around left TMJ.     Plan:  1- follow up in 2 weeks.     01.04.2022: He recently had a URI. He had dizziness and nausea. He had fever.   He feels better now, but still has some dizziness.  He feels some discomfort pressure under his left eye.   He has neck pain.      On examination, no purulent discharge in nose. No postnasal discharge. Nasal passages look open, but he can benefit from another RF treatment to posterior part of his nose.  My impression he has some viral infection with systemic symptoms.     Plan:  1- covid test  2- azithromycin tab, ibuprofen tab  3- follow up in 2 weeks     01.18.2022: He feels better, sense of smell is coming back. He recently started a blood pressure pill (lisinopril). He feels some drainage in his nose/sinuses. Little bit of phlegm.      On examination, bilaterally nasal passages look open, there is a small congested area at left inferior turbinate posteriorly.     I recommend to avoid wheat and corn.  Follow up in 4 months,   consider another RF  if you feel nasal congestion  referral to physical therapy, he has arm, neck pain, back pain               Review of Systems  Below is a copy of his initial ROS, he does not have fever today.        Constitutional: recent ~Ulb weight gain, but~no fever,~not feeling tired~and~no recent weight loss.   Eyes: eye pain, but~no double vision~and~no itching of the eyes.   ENMT: difficulty with hearing,~sinus pressure~and~snoring, but~no hearing loss,~no pain in the ear,~no vertigo,~no tinnitus,~the ears do not feel full,~no discharge from the ears,~no nosebleeds,~no nasal congestion,~no rhinorrhea,~no nasal blockage/obstruction,~no postnasal drip,~no sore throat,~no hoarseness,~the gums were not bleeding,~no dry mouth,~no dysphagia~and~no mouth ulcers.   Cardiovascular: lower extremity edema, but~no history of murmur,~no chest pain~and~no palpitations.   Respiratory: no shortness of breath,~no dyspnea during exertion,~no wheezing,~not coughing up sputum~and~not coughing up blood.   Gastrointestinal: no heartburn,~no vomiting,~no change in appetite~and~no pain on swallowing.   Genitourinary: no dysuria~and~no difficulty urinating.   Musculoskeletal: arthralgias, but~no myalgias.   Integumentary: no rashes,~no skin lesions,~no itching,~no dry skin~and~no unusual growth on the skin.   Neurological: headache, but~no fainting,~no numbness,~no convulsions~and~no weakness.   Psychiatric: no anxiety~and~no depression.   Endocrine: no increased thirst.   Hematologic/Lymphatic: no swollen glands,~no tendency for easy bleeding~and~no tendency for easy bruising.   ENT and Constitutional systems have been reviewed and are negative for complaint except what is stated in the HPI and/or Past Medical History.            Physical Exam  General appearance: Healthy-appearing, well-nourished, well groomed, in no acute distress.      Head and Face Head and face: Atraumatic with no masses, lesions, or scarring.      Salivary glands: No  tenderness of the parotid glands or parotid masses. (old exam)     No tenderness of the submandibular glands or submandibular masses. (old exam)     Facial strength: Normal strength and symmetry, no synkinesis or facial tic. Patient points out to left TMJ and related muscled for the site of pain.      Eyes: Conjunctivas look non-hyperemic bilaterally     Ears: Bilaterally ear canals look normal. Tympanic membranes intact, no hyperemia, fluid or retraction.     Nose: Mucosa looks normal. No purulent discharge. Left inferior turbinate shrunk. Right inferior turbinate is shrunk anteriorly.     Oral Cavity/Mouth: Lips and tongue look normal. (old exam)     Throat: No postnasal discharge. No tonsil hypertrophy. No hyperemia.(old exam)     Neck: Symmetrical, trachea midline. (old exam)     Pulmonary: Normal respiratory effort.      Lymphatic No palpable pathologic lymph nodes at neck. (old exam)     Neurological/Psychiatric Orientation to person, place, and time: Normal.   Mood and affect: Normal.      Extremities: No clubbing.        Results/Data  Xray Sinuses Complete Min 3 View [id number] 10:50AM Alesia Catherine      Test Name Result Flag Reference   Xray Sinuses Complete Min 3 View         Please click on the link to view the study images         Procedure  NASAL ENDOSCOPY: 01.18.2022  0 degree nasal endoscope was advanced through patient's nasal cavities.   On examination, bilaterally nasal passages look open, there is a small congested area at left inferior turbinate posteriorly.     NASAL ENDOSCOPY: 01.04.2022  0 degree nasal endoscope was advanced through patient's nasal cavities. On examination inferior turbinates look shrunk, there seems to be some congested areas posteriorly. No purulent discharge was noted on both sides.     NASAL ENDOSCOPY: 09.14.2021  0 degree nasal endoscope was advanced through patient's nasal cavities. On examination there was crusting at middle part of inferior turbinates. Anterior parts  "are shrunk, he can benefit from a second treatment tot middle and posterior parts of his inferior turbinates.     RADIOFREQUENCY APPLICATION TO INFERIOR NASAL TURBINATES: (old note)  Posterior part of right inferior turbinate looked congested. Lidocaine, mixed with bupivacaine and epinephrine was injected to posterior part of right inferior turbinate using 27g spinal needle. Radiofrequency was applied to right inferior turbinate's posterior part at multiple points (max 20 mackey). Minor bleeding was encountered. Patient tolerated the procedure well.     INJECTION NOTE: (old note)  ~10 mg dexamethasone mixed with local anesthetics was injected towards /around left temporomandibular joint using 30 g needle. FAcial functions were intact after the injection.             Diagnoses/Problems     · Dizziness (780.4) (R42)   · Dacryocystitis (375.30) (H04.309)     Orders     · Start: Doxycycline Hyclate 100 MG Oral Tablet; TAKE 1 TABLET EVERY 12 HOURS    · Start: Ofloxacin 0.3 % Ophthalmic Solution; 1-2 gtt in affected eye twice a day, for 3 days,  for red eye     · Xray Sinuses Complete Min 3 View; Status:Resulted - Preliminary;   Done: [id number]  10:50AM  Radiologist to Determine Optimal Study : Y  What are the patient's signs and symptoms? : acut sinusitis?     Patient Discussion/Summary     06.09.2025: He had a genoveva test at an outside hearing center. He has signs of acoustic trauma bilaterally worse in his left ear. History of acoustic trauma. Working in a noisy environment.     Recommendations:  1- hearing test   2- use hearing protection  _________________________________________________________________    07.17.2023: He had two \"bloody' eyes recently. Off and on he gets a discharge out of his left eye. Sometimes he gets a discharge out of his right eye too. No bloody eye for the past weeks. He still feels some left eye pain.     Secondly, he has neck pain. He has dizziness. He had it twice. Started last Tuesday " night. It went away but it lasted for couple of days lightly.      Ear fullness: maybe little bit at left  Tinnitus: off and on lightly  Recent decrease in hearing (-)   He is using blood pressure pills. He took it, it made dizziness worse.     Ears look normal  No visible purulent nasal or postnasal discharge.  Sinus x rays do not show an acute problem.  Mild whitish eye discharge.     My impression he off and on gets dacryocystitis.   Dizziness could be due to cervical problems or hypotension.      Recommendations:  1- oral generic augmentin for 10 days, ofloxacin eye drops  2- follow up as needed     ____________________________________________________________________________________________        05.17.2022: He comes for follow up. He feels inflammation in his left ear. He is on a bladder pill (oxybutynin).   he has nasal crusting, had some bleeding recently.  He has left TMJ pain  Off and on he has nasal congestion.     On examination, he has nasal crusting anteriorly on both sides.  Ears look essentially normal.     Plan:  1- mupirocin ointment  2- follow up in 3 months     _______________________________________________________________________________________________     Mr. Serrano is a 56 yo M. He has left facial pain for months. He has been to a chiropractor and recently, his pain is not bad at all for the past couple of weeks. It only happens when he rides an equipment at his workplace.  He also has headache issues.  He recently had an MRI at an outside center, it has been scanned into EMR. It shows mucosal thickenings at maxillary sinuses (mild) and a small left maxillary sinus cyst.  History of TMJ problem (+)  He has difficulty with breathing from his nose at night.     Retinal detachment (+) at left eye.  HT (+)     On examination, ears look normal  Inferior turbinates look moderately congested. Throat normal  Patient points out to left TMJ and related muscled for the site of pain.  Mild yellowish  discharge coming out of left eye.  Neck normal.     Dx: Left TMJ pain and related facial myalgia  Nasal congestion at night  Maxillary sinusitis (unlikely to cause this facial pain)  Left eye infection (mild)     Plan-recommendations:  1- Steroid injection to left TMJ  2- RF application to inferior nasal turbinates  3- heat application and massage to left facial muscles and to left TMJ  4- doxycycline and bacitracin ointment for left eye infection     08.18.2021: He has sinus pressure and difficulty with breathing from his nose for years.  He has nasal congestion, left facial pain is gone after oral antibiotic.  He had RF treatment to inferior turbinates.     RADIOFREQUENCY APPLICATION TO INFERIOR NASAL TURBINATES:  He has nasal congestion at night. 4% lidocaine was applied into patient's nose. Lidocaine, mixed with bupivacaine and epinephrine was injected to left inferior turbinate. Radiofrequency was applied to left inferior turbinate at multiple points (max 20 mackey).     Lidocaine, mixed with bupivacaine and epinephrine was injected to right inferior turbinate. Radiofrequency was applied to right inferior turbinate at multiple points. Minor bleeding was encountered (max 20 mackey). Patient tolerated the procedure well.     Plan:  1- follow up in 6 weeks     09.14.2021: He comes for follow up. Nose feels better. On examination ,there was crusting at middle part of inferior turbinates. Anterior parts are shrunk, he can benefit from a second treatment tot middle and posterior parts of his inferior turbinates.     He possibly has food allergies affecting his nose, cheese can make him stuffy,     Plan:  1- follow up in one month, possible second RF treatment  2- allergy test  3- continue nasal rinse and antibiotic ointment     10.12.2021: He comes for follow up. Off and on he gets left facial pain. On examination he has left TMJ tenderness. Left inferior turbinate was shrunk, Posterior part of right inferior turbinate  was still swollen. He had a second RF treatment to posterior part of right inferior turbinate today.     Local steroid and anesthetic injection was done to / around left TMJ.     Plan:  1- follow up in 2 weeks.     01.04.2022: He recently had a URI. He had dizziness and nausea. He had fever.   He feels better now, but still has some dizziness.  He feels some discomfort pressure under his left eye.   He has neck pain.      On examination, no purulent discharge in nose. No postnasal discharge. Nasal passages look open, but he can benefit from another RF treatment to posterior part of his nose.  My impression he has some viral infection with systemic symptoms.     Plan:  1- covid test  2- azithromycin tab, ibuprofen tab  3- follow up in 2 weeks     01.18.2022: He feels better, sense of smell is coming back. He recently started a blood pressure pill (lisinopril). He feels some drainage in his nose/sinuses. Little bit of phlegm.      On examination, bilaterally nasal passages look open, there is a small congested area at left inferior turbinate posteriorly.     I recommend to avoid wheat and corn.  Follow up in 4 months,   consider another RF if you feel nasal congestion  referral to physical therapy, he has arm, neck pain, back pain      Provider Impressions     PATIENT EDUCATION:  Radiofrequency Turbinate Reduction     The most common reason for nasal obstruction is mucosal hypertrophy of the inferior turbinate, followed by structural deformity of the nasal airway (septal deviation, bony inferior turbinate hypertrophy). Numerous interventions are available for the treatment of nasal obstruction secondary to inferior turbinate hypertrophy including medical treatments (immunotherapy, antihistamines, intranasal corticosteroid sprays, decongestants) and surgical options (cryosurgery, electrocautery, turbinate out-fracture, microdebrider-assisted turbinoplasty, excision and submucous resection). The goals of inferior  turbinate surgery include volume reduction, a reduction in nasal obstruction, and maintenance of nasal function while minimizing complications.     Radiofrequency turbinate reduction (RFTR) is a minimally invasive option that can reduce tissue volume in a precise, targeted manner. This technique uses radiofrequency to make lesions within the submucosal tissue of the turbinate, reducing tissue volume with minimal impact on surrounding tissues. Radiofrequency turbinate reduction differs fundamentally from traditional methods by using low-power radiofrequency energy to provide a relatively quick and painless procedure for tissue coagulation.

## 2025-06-11 DIAGNOSIS — M25.551 BILATERAL HIP PAIN: Primary | ICD-10-CM

## 2025-06-11 DIAGNOSIS — M25.552 BILATERAL HIP PAIN: Primary | ICD-10-CM

## 2025-06-11 NOTE — PROGRESS NOTES
Patient sent [employer] message for bilateral hip pain requesting new referral for pt/ot. Previous referral .    Order placed for pt/ot and orthopedic.

## 2025-06-17 ENCOUNTER — EVALUATION (OUTPATIENT)
Dept: PHYSICAL THERAPY | Facility: HOSPITAL | Age: 60
End: 2025-06-17
Payer: COMMERCIAL

## 2025-06-17 DIAGNOSIS — M25.552 BILATERAL HIP PAIN: Primary | ICD-10-CM

## 2025-06-17 DIAGNOSIS — M25.551 BILATERAL HIP PAIN: Primary | ICD-10-CM

## 2025-06-17 PROCEDURE — 97161 PT EVAL LOW COMPLEX 20 MIN: CPT | Mod: GP

## 2025-06-17 SDOH — ECONOMIC STABILITY: FOOD INSECURITY: WITHIN THE PAST 12 MONTHS, THE FOOD YOU BOUGHT JUST DIDN'T LAST AND YOU DIDN'T HAVE MONEY TO GET MORE.: NEVER TRUE

## 2025-06-17 SDOH — ECONOMIC STABILITY: FOOD INSECURITY: WITHIN THE PAST 12 MONTHS, YOU WORRIED THAT YOUR FOOD WOULD RUN OUT BEFORE YOU GOT MONEY TO BUY MORE.: NEVER TRUE

## 2025-06-17 ASSESSMENT — COLUMBIA-SUICIDE SEVERITY RATING SCALE - C-SSRS
2. HAVE YOU ACTUALLY HAD ANY THOUGHTS OF KILLING YOURSELF?: NO
6. HAVE YOU EVER DONE ANYTHING, STARTED TO DO ANYTHING, OR PREPARED TO DO ANYTHING TO END YOUR LIFE?: NO
1. IN THE PAST MONTH, HAVE YOU WISHED YOU WERE DEAD OR WISHED YOU COULD GO TO SLEEP AND NOT WAKE UP?: NO

## 2025-06-17 ASSESSMENT — PATIENT HEALTH QUESTIONNAIRE - PHQ9
1. LITTLE INTEREST OR PLEASURE IN DOING THINGS: NOT AT ALL
2. FEELING DOWN, DEPRESSED OR HOPELESS: NOT AT ALL
SUM OF ALL RESPONSES TO PHQ9 QUESTIONS 1 AND 2: 0

## 2025-06-17 ASSESSMENT — ENCOUNTER SYMPTOMS
DEPRESSION: 0
OCCASIONAL FEELINGS OF UNSTEADINESS: 0
LOSS OF SENSATION IN FEET: 0

## 2025-06-17 NOTE — PROGRESS NOTES
Physical Therapy  Physical Therapy Orthopedic Evaluation    Patient Name: Hugo Serrano  MRN: [id number]  Encounter Date: 6/17/2025  Time Calculation  Start Time: 0858  Stop Time: 0942  Time Calculation (min): 44 min  Total Timed Minutes: 44  Total Untimed Minutes: 0     Today's Charges  PT Evaluation Time Entry  PT Evaluation (Low) Time Entry: 44        Insurance:  Visit number: 1 of TBD  Authorization info: auth required  Payor: ANTHEM / Plan: ANTHEM HMP / Product Type: *No Product type* /     Current Problem  Problem List Items Addressed This Visit    None  Visit Diagnoses         Codes      Bilateral hip pain    -  Primary M25.551, M25.552    Relevant Orders    Follow Up In Physical Therapy          1. Bilateral hip pain  Referral to Physical Therapy    Follow Up In Physical Therapy          General:  General  Reason for Referral: L hip pain  Referred By: Keely Lawrence MD  Past Medical History Relevant to Rehab: HTN, HAs      Precautions:   Precautions  STEADI Fall Risk Score (The score of 4 or more indicates an increased risk of falling): see eval for assessment/score  Medical Precautions: No known precautions/limitation  Rehab Fall Risk: No fall risk identified    This patient is identified as a low fall risk based on the highest level factors present.    Outpatient Rehab Fall Risk Interventions:  Low Fall Risk Interventions: Low Fall Risk Interventions: Fall prevention education provided and Optimize clinic environment ensuring safe and accessible pathways      Medical History Form: Reviewed (scanned into chart)    Subjective:   Subjective   Chief Complaint: Patient presents to clinic w c/c of L hip pain. Pt states that it will occasionally be in his R. Does feel it radiating down into his L leg. Denies N/T. Does have h/o LBP and neck pain.  Onset Date: 3-4 months ago  KAT: Insidious    Current Condition:   Better    Pain:   Current: 1/10  Highest: 6/10 pain  Lowest: 1/10 pain  Location: L hip  posterior  Description: ache  Aggravating Factors:  Sitting and Lying on R side  Relieving Factors:  Rest    Relevant Information (PMH & Previous Tests/Imaging): L spine 2023: Moderate lumbar degenerative changes     Hip 2023: Mild osteoarthritis right hip. Small trochanteric spurs. No fracture  or lesion.    Previous Interventions/Treatments: Chiropractic    Prior Level of Function (PLOF)  Patient previously independent with all ADLs  Exercise/Physical Activity: gym weekly  Work/School: [employer]  Hobbies: none reported    Patients Living Environment: Reviewed and no concern    Primary Language: English    Patient's Goal(s) for Therapy: to be able to lay down w/o pain    Red Flags: Do you have any of the following? No  Fever/chills, unexplained weight changes, dizziness/fainting, unexplained change in bowel or bladder functions, unexplained malaise or muscle weakness, night pain/sweats, numbness or tingling    Objective:  Objective       ROM    Lumbar AROM - WNL all planes, no change in pain    Hip AROM / PROM WNL, though some limitations from body habitus     Knee AROM / PROM WNL          Strength Testing    Core/Abdominals: 4/5, strong TA contraction    Hip      (R)  (L)  Flexion: 5/5  4/5     Extension: 3+/5  3+/5    Abduction: 4/5  4-/5       Knee    (R)  (L)  Flexion: 5/5  4-/5      Extension: 5/5  4/5     Ankle    (R)  (L)  Plantarflexion: 4/5  4/5      Dorsiflexion: 4+/5  4+/5         Functional Screening    Posture: shoulder rounded forward, head forward, and increased lumbar lordosis, stance w slight hip internal rotation    Lower Extremity Functional Movements  Transfers: Independent  Gait: wide-based  Assistive Device: no device    Balance - deferred    Palpation: TTP nicolas at 3-5 in laterally from L5-S2, L>R     Joint Mobility: WNL    Flexibility: tight HS nicolas     Observation: endomorphic    Orthotics: shoe inserts      Special Tests    Kristi's Test: neg  Wilfred Test: pos  KAVITHA Test: neg  Hip Scour:  neg  Hip Impingement Test: neg  Trendelenburg Test: pos    Outcome Measures:  LEFS 45     EDUCATION:   Individual(s) Educated: patient  Education Provided: Home exercise program, plan of care, activity modifications, pain management, and injury pathology  Handout(s) Provided: Scanned into chart  Home Program:     Access Code: [email or web address]  URL: [email or web address]  Date: 06/17/2025  Prepared by: Rachael Love    Exercises  - Seated Hamstring Stretch  - 1-2 x daily - 7 x weekly - 3 sets - 20-30 sec hold  - Supine Bridge  - 1-2 x daily - 7 x weekly - 2 sets - 10 reps  - Clamshell  - 1-2 x daily - 7 x weekly - 2 sets - 10 reps  - Mini Squat  - 1-2 x daily - 7 x weekly - 2 sets - 10 reps    Risk and Benefits Discussed with Patient/Caregiver/Other: Yes   Patient/Caregiver Demonstrated Understanding: Yes   Plan of Care Discussed and Agreed Upon: Yes   Patient Response to Education: Patient/Caregiver verbalized understanding of information, Patient/Caregiver performed return demonstration of exercises/activities, and Patient/Caregiver asked appropriate questions    Assessment: Patient presents with signs and symptoms consistent with nicolas hip pain, ROM/strength deficits, resulting in limited participation in pain-free ADLs and inability to perform at their prior level of function. Skilled PT warranted to address the above stated impairments, so the patient can perform FA's without increased pain or difficulty.        Screening  Frequency  Date Last Completed   Spiritual and Cultural Beliefs   Screening each visit or episode of care 6/17/2025   Falls Risk Screening every ambulatory visit 6/17/2025 10:40 AM   Pain Screening annually at primary care visit  5/19/2025   Domestic Violence screening annually at primary care visit 6/17/2025   Depression Screening annually in the primary care setting 6/17/2025   Suicide Risk Screening annually in the primary care setting 6/17/2025   Nutrition and Food  Insecurity   Screening at least annually at primary care visit  6/17/2025   Key Learner annually in the primary care setting 6/17/2025   Drug Screen  6/9/2025  9:41 AM   Alcohol Screen  6/9/2025  9:41 AM   Advance Directive         Clinical Presentation: Stable and/or uncomplicated characteristics    Complexity: low    Plan:  Treatment/Interventions: Education/ Instruction, Manual therapy, Neuromuscular re-education, Therapeutic activities, Therapeutic exercises  PT Plan: Skilled PT  PT Frequency: 1 time per week  Duration: 6 weeks  Onset Date: 02/17/25  Certification Period Start Date: 06/17/25  Certification Period End Date: 07/29/25  Number of Treatments Authorized: 1 of TBD  Rehab Potential: Good  Plan of Care Agreement: Patient    Goals: Set and discussed today  Active       PT Problem       Pt will be indep and compliant in administering HEP        Start:  06/17/25    Expected End:  07/01/25            Pt will report decreased pain to no more than </=3/10.        Start:  06/17/25    Expected End:  07/01/25            Patient will verbalize 3 interventions strategies for fall prevention to carryover at home, and to contribute to either a decrease or a maintenance in Steadi score as outlined above as it pertains.         Start:  06/17/25    Expected End:  07/01/25            Pt will improve LEFs score to >50 as evidence of improved functional ability.        Start:  06/17/25    Expected End:  07/29/25            Pt will improve nicolas hip ext/abd strength to 4/5 or more as evidence of improved functional mobility.         Start:  06/17/25    Expected End:  07/29/25            Pt will improve nicolas knee flex/ext strength to 4/5 or more as evidence of improved functional mobility.         Start:  06/17/25    Expected End:  07/29/25            Patient Stated Goal is to be able to lay on side again       Start:  06/17/25    Expected End:  07/29/25            Pt will demo ability to hold semi tandem stance for 10 sec or  more nicolas       Start:  06/17/25    Expected End:  07/29/25            Pt will improve hip flexibility to WNL nicolas and in all planes       Start:  06/17/25    Expected End:  07/29/25                Plan of care was developed with input and agreement by the patient      Treatment Performed:  See NOEL Love, PT

## 2025-06-25 ENCOUNTER — TREATMENT (OUTPATIENT)
Dept: PHYSICAL THERAPY | Facility: HOSPITAL | Age: 60
End: 2025-06-25
Payer: COMMERCIAL

## 2025-06-25 DIAGNOSIS — M25.552 BILATERAL HIP PAIN: ICD-10-CM

## 2025-06-25 DIAGNOSIS — M25.551 BILATERAL HIP PAIN: ICD-10-CM

## 2025-06-25 PROCEDURE — 97110 THERAPEUTIC EXERCISES: CPT | Mod: GP,CQ

## 2025-06-25 NOTE — PROGRESS NOTES
Physical Therapy Treatment    Patient Name: Hugo Serrano  MRN: [id number]  Today's Date: 6/25/2025  Payor: CLARK / Plan: CLARK HMP / Product Type: *No Product type* /   Time Calculation  Start Time: 1035  Stop Time: 1115  Time Calculation (min): 40 min     PT Therapeutic Procedures Time Entry  Therapeutic Exercise Time Entry: 40     Current Problem  1. Bilateral hip pain  Follow Up In Physical Therapy        Problem List Items Addressed This Visit    None  Visit Diagnoses         Codes      Bilateral hip pain     M25.551, M25.552          General  Reason for Referral: L hip pain  Referred By: Keely Lawrence MD  Past Medical History Relevant to Rehab: HTN, HAs    Subjective   Current Condition:   Better  Patient reports he has had this issue for many years. He also feels the exercises are helping     Performing HEP?: Yes    Precautions  Precautions  Medical Precautions: No known precautions/limitation  Pain     Objective     Treatments:    Therapeutic Exercise  Therapeutic Exercise Performed: Yes  Therapeutic Exercise Activity 1: physiostep x 10 mins  Therapeutic Exercise Activity 2: Sauders hip ext 3x10  Therapeutic Exercise Activity 3: Duarte back ext 3x10  Therapeutic Exercise Activity 4: Clamshell 3x10  Therapeutic Exercise Activity 5: SL abd 3x10  Therapeutic Exercise Activity 6: Bridging 3x10  Therapeutic Exercise Activity 7: Dead Bug 3x10 secs  Therapeutic Exercise Activity 8: Standing abd at wall 3x10  Therapeutic Exercise Activity 9: Heel raises at wall 3x10     EDUCATION:   Individual(s) Educated: Patient  Education Provided: Same as previous  Handout(s) Provided: Scanned into chart  Home Program: Previous Access Code: [email or web address]  URL: [email or web address]  Date: 06/25/2025  Prepared by: Rachael Love     Exercises  - Standing Hip Abduction with Counter Support  - 1 x daily - 7 x weekly - 3 sets - 10 reps  - Sidelying Hip Abduction  - 1 x daily - 7 x weekly - 3 sets - 10  reps  - Heel Raises with Counter Support  - 1 x daily - 7 x weekly - 3 sets - 10 reps  - Isometric Dead Bug  - 1 x daily - 7 x weekly - 3 sets - 20-30 sec hold  Risk and Benefits Discussed with Patient/Caregiver/Other: Yes   Patient/Caregiver Demonstrated Understanding: Yes   Patient Response to Education: Patient/Caregiver verbalized understanding of information, Patient/Caregiver performed return demonstration of exercises/activities, and Patient/Caregiver asked appropriate questions    Assessment: Patient tolerated treatment well with addition of new exercises      Plan: Continue with POC. Continue with core stability, LE strengthening, ROM, flexibility, and balance, so the patient can perform FA's without increased pain or difficulty.    OP PT Plan  Treatment/Interventions: Education/ Instruction, Manual therapy, Neuromuscular re-education, Therapeutic activities, Therapeutic exercises  PT Plan: Skilled PT  PT Frequency: 1 time per week  Duration: 6 weeks  Onset Date: 02/17/25  Certification Period Start Date: 06/17/25  Certification Period End Date: 07/29/25  Number of Treatments Authorized: 2 of TBD  Rehab Potential: Good  Plan of Care Agreement: Patient    Goals:  Active       PT Problem       Pt will be indep and compliant in administering HEP  (Progressing)       Start:  06/17/25    Expected End:  07/01/25            Pt will report decreased pain to no more than </=3/10.  (Progressing)       Start:  06/17/25    Expected End:  07/01/25            Patient will verbalize 3 interventions strategies for fall prevention to carryover at home, and to contribute to either a decrease or a maintenance in Steadi score as outlined above as it pertains.   (Progressing)       Start:  06/17/25    Expected End:  07/01/25            Pt will improve LEFs score to >50 as evidence of improved functional ability.  (Progressing)       Start:  06/17/25    Expected End:  07/29/25            Pt will improve nicolas hip ext/abd strength  to 4/5 or more as evidence of improved functional mobility.   (Progressing)       Start:  06/17/25    Expected End:  07/29/25            Pt will improve nicolas knee flex/ext strength to 4/5 or more as evidence of improved functional mobility.   (Progressing)       Start:  06/17/25    Expected End:  07/29/25            Patient Stated Goal is to be able to lay on side again (Progressing)       Start:  06/17/25    Expected End:  07/29/25            Pt will demo ability to hold semi tandem stance for 10 sec or more nicolas       Start:  06/17/25    Expected End:  07/29/25            Pt will improve hip flexibility to WNL nicolas and in all planes       Start:  06/17/25    Expected End:  07/29/25         Goal Note       10Patient will maintain single leg stand on each leg for 10 sec with no UE support                   Chaim Hartman, PTA

## 2025-07-02 ENCOUNTER — TREATMENT (OUTPATIENT)
Dept: PHYSICAL THERAPY | Facility: HOSPITAL | Age: 60
End: 2025-07-02
Payer: COMMERCIAL

## 2025-07-02 DIAGNOSIS — M25.552 BILATERAL HIP PAIN: ICD-10-CM

## 2025-07-02 DIAGNOSIS — M25.551 BILATERAL HIP PAIN: ICD-10-CM

## 2025-07-02 PROCEDURE — 97110 THERAPEUTIC EXERCISES: CPT | Mod: GP

## 2025-07-02 ASSESSMENT — PAIN - FUNCTIONAL ASSESSMENT: PAIN_FUNCTIONAL_ASSESSMENT: 0-10

## 2025-07-02 ASSESSMENT — PAIN SCALES - GENERAL: PAINLEVEL_OUTOF10: 0 - NO PAIN

## 2025-07-02 NOTE — PROGRESS NOTES
Physical Therapy Treatment    Patient Name: Hugo Serrano  MRN: [id number]  Today's Date: 7/2/2025  Payor: CLARK / Plan: CLARK HMP / Product Type: *No Product type* /   Time Calculation  Start Time: 0946  Stop Time: 1025  Time Calculation (min): 39 min        PT Therapeutic Procedures Time Entry  Therapeutic Exercise Time Entry: 39      Current Problem  1. Bilateral hip pain  Follow Up In Physical Therapy        Problem List Items Addressed This Visit    None  Visit Diagnoses         Codes      Bilateral hip pain     M25.551, M25.552            General  Reason for Referral: L hip pain  Referred By: Keely Lawrence MD  Past Medical History Relevant to Rehab: HTN, HAs    Subjective   Current Condition:   Better  Patient reports he does feel better and can pick things up off the ground easier, but does feel like he still has pain w work related activities.     Performing HEP?: Yes    Precautions  Precautions  Medical Precautions: No known precautions/limitation  Pain  Pain Assessment: 0-10  0-10 (Numeric) Pain Score: 0 - No pain    Objective     Treatments:    Therapeutic Exercise  Therapeutic Exercise Performed: Yes  Therapeutic Exercise Activity 1: physiostep x 5 mins  Therapeutic Exercise Activity 2: seated HS stretch 2x30 sec nicolas  Therapeutic Exercise Activity 3: seated hip ER stretch 3x20 sec nicolas  Therapeutic Exercise Activity 4: Clamshell 2x10  Therapeutic Exercise Activity 5: Reverse clamshell 2x10  Therapeutic Exercise Activity 6: 2x10 TA activation w head lift  Therapeutic Exercise Activity 8: Standing hip flex stretch 1x20 sec  Therapeutic Exercise Activity 9: hip abd machine [id number]     EDUCATION:   Individual(s) Educated: Patient  Education Provided: goals of new interventions  Handout(s) Provided: Scanned into chart  Home Program: see previous notes  Risk and Benefits Discussed with Patient/Caregiver/Other: Yes   Patient/Caregiver Demonstrated Understanding: Yes   Patient Response to Education:  Patient/Caregiver verbalized understanding of information, Patient/Caregiver performed return demonstration of exercises/activities, and Patient/Caregiver asked appropriate questions    Assessment:   Pt tolerating session well. Session focused on hip ROM and strengthening in all planes. Pt progressing well towards goals, but they cont to demonstrate pain w stairs, ADLs, and work related activities. Pt cont to demo deficits as stated, and requires cont skilled PT intervention to assist pt in returning to PLOF.       Plan: Continue with POC. Continue with core stability, LE strengthening, ROM, flexibility, and balance, so the patient can perform FA's without increased pain or difficulty.   OP PT Plan  Treatment/Interventions: Education/ Instruction, Manual therapy, Neuromuscular re-education, Therapeutic activities, Therapeutic exercises  PT Plan: Skilled PT  PT Frequency: 1 time per week  Duration: 6 weeks  Onset Date: 02/17/25  Certification Period Start Date: 06/17/25  Certification Period End Date: 07/29/25  Number of Treatments Authorized: 3 of 7 approved  Rehab Potential: Good  Plan of Care Agreement: Patient    Goals:  Active       PT Problem       Pt will be indep and compliant in administering HEP  (Progressing)       Start:  06/17/25    Expected End:  07/01/25            Pt will report decreased pain to no more than </=3/10.  (Progressing)       Start:  06/17/25    Expected End:  07/01/25            Patient will verbalize 3 interventions strategies for fall prevention to carryover at home, and to contribute to either a decrease or a maintenance in Steadi score as outlined above as it pertains.   (Met)       Start:  06/17/25    Expected End:  07/01/25    Resolved:  07/02/25         Pt will improve LEFs score to >50 as evidence of improved functional ability.  (Progressing)       Start:  06/17/25    Expected End:  07/29/25            Pt will improve nicolas hip ext/abd strength to 4/5 or more as evidence of  improved functional mobility.   (Progressing)       Start:  06/17/25    Expected End:  07/29/25            Pt will improve nicolas knee flex/ext strength to 4/5 or more as evidence of improved functional mobility.   (Progressing)       Start:  06/17/25    Expected End:  07/29/25            Patient Stated Goal is to be able to lay on side again (Progressing)       Start:  06/17/25    Expected End:  07/29/25            Pt will demo ability to hold semi tandem stance for 10 sec or more nicolas (Progressing)       Start:  06/17/25    Expected End:  07/29/25            Pt will improve hip flexibility to WNL nicolas and in all planes (Progressing)       Start:  06/17/25    Expected End:  07/29/25                 Rachael Love, PT

## 2025-07-09 ENCOUNTER — TREATMENT (OUTPATIENT)
Dept: PHYSICAL THERAPY | Facility: HOSPITAL | Age: 60
End: 2025-07-09
Payer: COMMERCIAL

## 2025-07-09 DIAGNOSIS — M25.551 BILATERAL HIP PAIN: ICD-10-CM

## 2025-07-09 DIAGNOSIS — M25.552 BILATERAL HIP PAIN: ICD-10-CM

## 2025-07-09 PROCEDURE — 97110 THERAPEUTIC EXERCISES: CPT | Mod: GP,CQ

## 2025-07-09 ASSESSMENT — PAIN - FUNCTIONAL ASSESSMENT: PAIN_FUNCTIONAL_ASSESSMENT: 0-10

## 2025-07-09 ASSESSMENT — PAIN SCALES - GENERAL: PAINLEVEL_OUTOF10: 2

## 2025-07-09 NOTE — PROGRESS NOTES
Physical Therapy Treatment    Patient Name: Hugo Serrano  MRN: [id number]  Today's Date: 7/9/2025  Time Calculation  Start Time: 1030  Stop Time: 1108  Time Calculation (min): 38 min        PT Therapeutic Procedures Time Entry  Therapeutic Exercise Time Entry: 38                Insurance:  Visit number: 4 of 7  Authorization info: 7 visits approved   Insurance Type: [place]   Certification Period Start Date: 06/17/25  Certification Period End Date: 07/29/25      Current Problem  1. Bilateral hip pain  Follow Up In Physical Therapy          General  Reason for Referral: L hip pain  Referred By: Keely Lawrence MD  Past Medical History Relevant to Rehab: HTN, HAs      Subjective   Current Condition:   Better  Patient reports depending on what he does his pain will either increase or decrease. The other day he noticed it more on the front of his hip.     Performing HEP?: Yes    Precautions  Precautions  Medical Precautions: No known precautions/limitation  Pain  Pain Assessment: 0-10  0-10 (Numeric) Pain Score: 2  Pain Type: Chronic pain  Pain Location: Hip  Pain Orientation: Right, Posterior, Inner    Objective     Good tolerance to activities     Treatments:    Therapeutic Exercise  Therapeutic Exercise Performed: Yes  Therapeutic Exercise Activity 1: physiostep x 5 mins  Therapeutic Exercise Activity 2: seated HS stretch 2x30 sec nicolas  Therapeutic Exercise Activity 3: seated hip ER stretch 3x20 sec nicolas  Therapeutic Exercise Activity 4: Clamshell 2x10  Therapeutic Exercise Activity 5: Reverse clamshell 2x10  Therapeutic Exercise Activity 6: 2x10 TA activation w head lift  Therapeutic Exercise Activity 7: Dead Bug 3x10 secs  Therapeutic Exercise Activity 8: Standing hip flex stretch 1x20 sec  Therapeutic Exercise Activity 9: hip abd machine [id number]  Therapeutic Exercise Activity 10: bridges 3x10  Therapeutic Exercise Activity 11: SKTC stretch 3x20 sec  Therapeutic Exercise Activity 12: standing hip ext  2x10    EDUCATION:   Individual(s) Educated: Patient   Education Provided: Body Mechanics   Handout(s) Provided: Scanned into chart  Home Program: In previous notes  Risk and Benefits Discussed with Patient/Caregiver/Other: Yes   Patient/Caregiver Demonstrated Understanding: Yes   Patient Response to Education: Patient/Caregiver verbalized understanding of information and Patient/Caregiver performed return demonstration of exercises/activities    Assessment:   Patient tolerated session well. Continued focus on him ROM and strengthening, still requiring cuing to ensure using correct techniques. Able to add in a few more interventions for increased challenge. Making progress towards goals       Patient requested other exercises be added to HEP so he can continue making progress at home since he is only coming x1 a week    Access Code: [email or web address]  URL: [email or web address]  Date: 07/09/2025  Prepared by: Chris Trevino    Exercises  - Sidelying Reverse Clamshell  - 1 x daily - 7 x weekly - 3 sets - 10 reps  - Standing Hip ER Stretch at Table  - 1 x daily - 7 x weekly - 4 sets - 20 sec hold  - Supine Transversus Abdominis Bracing - Hands on Stomach  - 1 x daily - 7 x weekly - 3 sets - 10 reps  - Hooklying Single Knee to Chest Stretch  - 1 x daily - 7 x weekly - 3 sets - 20 sec hold        Plan:  Continue with POC and as per patient tolerated to improve ability to complete functional tasks  Frequency: 1 x Week  Duration: 6 Weeks       Goals:  Active       PT Problem       Pt will be indep and compliant in administering HEP  (Progressing)       Start:  06/17/25    Expected End:  07/01/25            Pt will report decreased pain to no more than </=3/10.  (Progressing)       Start:  06/17/25    Expected End:  07/01/25            Patient will verbalize 3 interventions strategies for fall prevention to carryover at home, and to contribute to either a decrease or a maintenance in Steadi score as outlined  above as it pertains.   (Met)       Start:  06/17/25    Expected End:  07/01/25    Resolved:  07/02/25         Pt will improve LEFs score to >50 as evidence of improved functional ability.  (Progressing)       Start:  06/17/25    Expected End:  07/29/25            Pt will improve nicolas hip ext/abd strength to 4/5 or more as evidence of improved functional mobility.   (Progressing)       Start:  06/17/25    Expected End:  07/29/25            Pt will improve nicolas knee flex/ext strength to 4/5 or more as evidence of improved functional mobility.   (Progressing)       Start:  06/17/25    Expected End:  07/29/25            Patient Stated Goal is to be able to lay on side again (Progressing)       Start:  06/17/25    Expected End:  07/29/25            Pt will demo ability to hold semi tandem stance for 10 sec or more nicolas (Progressing)       Start:  06/17/25    Expected End:  07/29/25            Pt will improve hip flexibility to WNL nicolas and in all planes (Progressing)       Start:  06/17/25    Expected End:  07/29/25                 Milli Klein, PTA

## 2025-07-16 ENCOUNTER — TREATMENT (OUTPATIENT)
Dept: PHYSICAL THERAPY | Facility: HOSPITAL | Age: 60
End: 2025-07-16
Payer: COMMERCIAL

## 2025-07-16 DIAGNOSIS — M25.551 BILATERAL HIP PAIN: ICD-10-CM

## 2025-07-16 DIAGNOSIS — M25.552 BILATERAL HIP PAIN: ICD-10-CM

## 2025-07-16 PROCEDURE — 97110 THERAPEUTIC EXERCISES: CPT | Mod: GP | Performed by: PHYSICAL THERAPIST

## 2025-07-16 ASSESSMENT — PAIN SCALES - GENERAL: PAINLEVEL_OUTOF10: 2

## 2025-07-16 NOTE — PROGRESS NOTES
Physical Therapy Treatment    Patient Name: Hugo Serrano  MRN: [id number]  Today's Date: 7/16/2025  Payor: [employer] / Plan: [employer] / Product Type: *No Product type* /   Time Calculation  Start Time: 1115  Stop Time: 1158  Time Calculation (min): 43 min        PT Therapeutic Procedures Time Entry  Therapeutic Exercise Time Entry: 43                  Current Problem  1. Bilateral hip pain  Follow Up In Physical Therapy        Problem List Items Addressed This Visit    None  Visit Diagnoses         Codes      Bilateral hip pain     M25.551, M25.552            General  Reason for Referral: L hip pain  Referred By: Keely Lawrence MD  Past Medical History Relevant to Rehab: HTN, HAs    Subjective   Current Condition:   Better  Patient reports he does a lot of exercise at home which is why he has not gone to the gym recently  Performing HEP?: Yes    Precautions  Precautions  Medical Precautions: No known precautions/limitation  Pain  0-10 (Numeric) Pain Score: 2  Pain Location: Hip  Pain Orientation: Right    Objective       Treatments:    Therapeutic Exercise  Therapeutic Exercise Performed: Yes  Therapeutic Exercise Activity 1: physiostep x 5 mins  Therapeutic Exercise Activity 2: modified hurdler BL 10h10  Therapeutic Exercise Activity 3: seated hip ER stretch 3x20 sec nicolas  Therapeutic Exercise Activity 4: SL Clamshell 2x10 gtb  Therapeutic Exercise Activity 5: Reverse clamshell 2x10 gtb  Therapeutic Exercise Activity 6: leaning on wal TA contraction with toe raise 3x10  Therapeutic Exercise Activity 9: standingabd 3x10  Therapeutic Exercise Activity 10: bridges with [id number]  Therapeutic Exercise Activity 12: standing hip ext 3x10                             EDUCATION:   Individual(s) Educated: Patient  Education Provided: posture and glute med   Handout(s) Provided: Scanned into chart  Home Program: continue  Risk and Benefits Discussed with Patient/Caregiver/Other: Yes   Patient/Caregiver Demonstrated  Understanding: Yes   Patient Response to Education: Patient/Caregiver verbalized understanding of information, Patient/Caregiver performed return demonstration of exercises/activities, and Patient/Caregiver asked appropriate questions    Assessment: pt benefited from session displaying improved activity       Plan: Continue with POC.   OP PT Plan  Treatment/Interventions: Education/ Instruction, Manual therapy, Neuromuscular re-education, Therapeutic activities, Therapeutic exercises  PT Plan: Skilled PT  PT Frequency: 1 time per week  Duration: 6 weeks  Onset Date: 02/17/25  Certification Period Start Date: 06/17/25  Certification Period End Date: 07/29/25  Number of Treatments Authorized: 5 of 7 approved  Rehab Potential: Good  Plan of Care Agreement: Patient    Goals:  Active       PT Problem       Pt will be indep and compliant in administering HEP  (Met)       Start:  06/17/25    Expected End:  07/01/25    Resolved:  07/16/25         Pt will report decreased pain to no more than </=3/10.  (Progressing)       Start:  06/17/25    Expected End:  07/01/25            Patient will verbalize 3 interventions strategies for fall prevention to carryover at home, and to contribute to either a decrease or a maintenance in Steadi score as outlined above as it pertains.   (Met)       Start:  06/17/25    Expected End:  07/01/25    Resolved:  07/02/25         Pt will improve LEFs score to >50 as evidence of improved functional ability.  (Progressing)       Start:  06/17/25    Expected End:  07/29/25            Pt will improve nicolas hip ext/abd strength to 4/5 or more as evidence of improved functional mobility.   (Progressing)       Start:  06/17/25    Expected End:  07/29/25            Pt will improve nicolas knee flex/ext strength to 4/5 or more as evidence of improved functional mobility.   (Progressing)       Start:  06/17/25    Expected End:  07/29/25            Patient Stated Goal is to be able to lay on side again  (Progressing)       Start:  06/17/25    Expected End:  07/29/25            Pt will demo ability to hold semi tandem stance for 10 sec or more nicolas (Progressing)       Start:  06/17/25    Expected End:  07/29/25            Pt will improve hip flexibility to WNL nicolas and in all planes (Progressing)       Start:  06/17/25    Expected End:  07/29/25                 Adele Oreilly, PT

## 2025-07-24 ENCOUNTER — APPOINTMENT (OUTPATIENT)
Dept: PHYSICAL THERAPY | Facility: HOSPITAL | Age: 60
End: 2025-07-24
Payer: COMMERCIAL

## 2025-07-24 DIAGNOSIS — M25.552 BILATERAL HIP PAIN: ICD-10-CM

## 2025-07-24 DIAGNOSIS — M25.551 BILATERAL HIP PAIN: ICD-10-CM

## 2025-07-24 PROCEDURE — 97110 THERAPEUTIC EXERCISES: CPT | Mod: GP

## 2025-07-24 ASSESSMENT — PAIN - FUNCTIONAL ASSESSMENT: PAIN_FUNCTIONAL_ASSESSMENT: 0-10

## 2025-07-24 ASSESSMENT — PAIN SCALES - GENERAL: PAINLEVEL_OUTOF10: 0 - NO PAIN

## 2025-07-29 ENCOUNTER — APPOINTMENT (OUTPATIENT)
Dept: PHYSICAL THERAPY | Facility: HOSPITAL | Age: 60
End: 2025-07-29
Payer: COMMERCIAL

## 2025-07-29 DIAGNOSIS — M25.552 BILATERAL HIP PAIN: ICD-10-CM

## 2025-07-29 DIAGNOSIS — M25.551 BILATERAL HIP PAIN: ICD-10-CM

## 2025-07-29 PROCEDURE — 97110 THERAPEUTIC EXERCISES: CPT | Mod: GP

## 2025-07-29 ASSESSMENT — PAIN - FUNCTIONAL ASSESSMENT: PAIN_FUNCTIONAL_ASSESSMENT: 0-10

## 2025-07-29 ASSESSMENT — PAIN SCALES - GENERAL: PAINLEVEL_OUTOF10: 0 - NO PAIN

## 2025-07-29 NOTE — PROGRESS NOTES
Physical Therapy Treatment and Discharge    Patient Name: Hugo Serrano  MRN: [id number]  Today's Date: 7/29/2025  Payor: [employer] / Plan: [employer] / Product Type: *No Product type* /   Time Calculation  Start Time: 1025  Stop Time: 1104  Time Calculation (min): 39 min        PT Therapeutic Procedures Time Entry  Therapeutic Exercise Time Entry: 39        Current Problem  1. Bilateral hip pain  Follow Up In Physical Therapy        Problem List Items Addressed This Visit    None  Visit Diagnoses         Codes      Bilateral hip pain     M25.551, M25.552            General  Reason for Referral: L hip pain  Referred By: Keely Lawrence MD  Past Medical History Relevant to Rehab: HTN, HAs    Subjective   Current Condition:   Better  Patient reports that he feels overall better since beginning PT. Does cont to have intermittent pain w driving and deep sitting. Does feel like his pain has been no more than a 2/10 in the last week.     Performing HEP?: Yes    Precautions  Precautions  Medical Precautions: No known precautions/limitation  Pain  Pain Assessment: 0-10  0-10 (Numeric) Pain Score: 0 - No pain    Objective                           ROM     Lumbar AROM - WNL all planes     Hip AROM / PROM WNL, though some limitations from body habitus              Knee AROM / PROM WNL                                                                       Strength Testing     Core/Abdominals: 4+/5, strong TA contraction, some limitations from body habitus     Hip                             (R)                    (L)  Flexion:            5/5                   4+/5                                 Extension:        4-/5                 4-/5                   Abduction:       4/5                   4/5                                   Knee                          (R)                    (L)  Flexion:            5/5                   4/5                                            Extension:        5/5                   4+/5             Ankle                          (R)                    (L)  Plantarflexion:  5/5                   5/5                                             Dorsiflexion:     5/5                 5/5              Balance:   - semi tandem stance: 30 sec nicolas  - tandem stance: 20 sec nicolas            Outcome Measures:  LEFs: 61    Treatments:    Therapeutic Exercise  Therapeutic Exercise Performed: Yes  Therapeutic Exercise Activity 1: physiostep x 6 mins  Therapeutic Exercise Activity 2: recheck  Therapeutic Exercise Activity 3: HEP as below     EDUCATION:   Individual(s) Educated: Patient  Education Provided: HEP, POC  Handout(s) Provided: Scanned into chart  Home Program:     Access Code: [email or web address]  URL: [email or web address]  Date: 07/29/2025  Prepared by: Rachael Love    Exercises  - Clamshell with Resistance  - 1 x daily - 7 x weekly - 2 sets - 10 reps  - Sidelying Reverse Clamshell with Resistance  - 1 x daily - 7 x weekly - 2 sets - 10 reps  - Sidelying Hip Abduction  - 1 x daily - 7 x weekly - 2 sets - 10 reps  - Curl Up with Reach  - 1 x daily - 7 x weekly - 2 sets - 10 reps  - Seated Hamstring Stretch  - 1 x daily - 7 x weekly - 3 sets - 20-30 sec hold  - Mini Squat  - 1 x daily - 7 x weekly - 2 sets - 6-8 reps  - Standing Alternating Knee Flexion  - 1 x daily - 7 x weekly - 2 sets - 10 reps  - Standing Heel Raise  - 1 x daily - 7 x weekly - 2 sets - 10 reps    Risk and Benefits Discussed with Patient/Caregiver/Other: Yes   Patient/Caregiver Demonstrated Understanding: Yes   Patient Response to Education: Patient/Caregiver verbalized understanding of information, Patient/Caregiver performed return demonstration of exercises/activities, and Patient/Caregiver asked appropriate questions    Assessment:   Pt has made significant progress since beginning PT. Pt states that he feel much better regarding his strength and ROM at this time. Pt has met all goals. Pt reporting some residual deficits/pain,  but feels comfortable managing by self w HEP. D/t pt progress, pt will be DC from OPPT services this date. Pt edu on performing HEP for next few weeks to maintain progress and pt verbalized understanding.       Plan: DC from OPPT  OP PT Plan  Treatment/Interventions: Education/ Instruction, Manual therapy, Neuromuscular re-education, Therapeutic activities, Therapeutic exercises  PT Plan: Skilled PT  PT Frequency: 1 time per week  Duration: 6 weeks  Onset Date: 02/17/25  Certification Period Start Date: 06/17/25  Certification Period End Date: 07/29/25  Number of Treatments Authorized: 7 of 7 approved  Rehab Potential: Good  Plan of Care Agreement: Patient    Goals:  Resolved       PT Problem       Pt will be indep and compliant in administering HEP  (Met)       Start:  06/17/25    Expected End:  07/01/25    Resolved:  07/16/25         Pt will report decreased pain to no more than </=3/10.  (Met)       Start:  06/17/25    Expected End:  07/01/25    Resolved:  07/29/25         Patient will verbalize 3 interventions strategies for fall prevention to carryover at home, and to contribute to either a decrease or a maintenance in Steadi score as outlined above as it pertains.   (Met)       Start:  06/17/25    Expected End:  07/01/25    Resolved:  07/02/25         Pt will improve LEFs score to >50 as evidence of improved functional ability.  (Met)       Start:  06/17/25    Expected End:  07/29/25    Resolved:  07/29/25         Pt will improve nicolas hip ext/abd strength to 4/5 or more as evidence of improved functional mobility.   (Adequate for Discharge)       Start:  06/17/25    Expected End:  07/29/25    Resolved:  07/29/25         Pt will improve nicolas knee flex/ext strength to 4/5 or more as evidence of improved functional mobility.   (Met)       Start:  06/17/25    Expected End:  07/29/25    Resolved:  07/29/25         Patient Stated Goal is to be able to lay on side again (Met)       Start:  06/17/25    Expected End:   07/29/25    Resolved:  07/29/25         Pt will demo ability to hold semi tandem stance for 10 sec or more nicolas (Met)       Start:  06/17/25    Expected End:  07/29/25    Resolved:  07/29/25         Pt will improve hip flexibility to WNL nicolas and in all planes (Adequate for Discharge)       Start:  06/17/25    Expected End:  07/29/25    Resolved:  07/29/25              Rachael Love, PT

## 2025-08-27 DIAGNOSIS — I10 BENIGN ESSENTIAL HTN: ICD-10-CM

## 2025-08-29 RX ORDER — DOXAZOSIN 4 MG/1
4 TABLET ORAL NIGHTLY
Qty: 90 TABLET | Refills: 0 | Status: SHIPPED | OUTPATIENT
Start: 2025-08-29

## 2025-09-18 ENCOUNTER — APPOINTMENT (OUTPATIENT)
Dept: ORTHOPEDIC SURGERY | Facility: CLINIC | Age: 60
End: 2025-09-18
Payer: COMMERCIAL

## 2025-09-22 ENCOUNTER — APPOINTMENT (OUTPATIENT)
Dept: PRIMARY CARE | Facility: CLINIC | Age: 60
End: 2025-09-22
Payer: COMMERCIAL

## 2026-06-08 ENCOUNTER — APPOINTMENT (OUTPATIENT)
Dept: AUDIOLOGY | Facility: CLINIC | Age: 61
End: 2026-06-08
Payer: COMMERCIAL

## 2026-06-08 ENCOUNTER — APPOINTMENT (OUTPATIENT)
Dept: OTOLARYNGOLOGY | Facility: CLINIC | Age: 61
End: 2026-06-08
Payer: COMMERCIAL